# Patient Record
Sex: FEMALE | Race: WHITE | NOT HISPANIC OR LATINO | Employment: OTHER | URBAN - METROPOLITAN AREA
[De-identification: names, ages, dates, MRNs, and addresses within clinical notes are randomized per-mention and may not be internally consistent; named-entity substitution may affect disease eponyms.]

---

## 2019-10-30 ENCOUNTER — HISTORICAL (OUTPATIENT)
Dept: ADMINISTRATIVE | Facility: HOSPITAL | Age: 79
End: 2019-10-30

## 2019-10-30 LAB
ABS NEUT (OLG): 5.44 X10(3)/MCL (ref 2.1–9.2)
BASOPHILS # BLD AUTO: 0.1 X10(3)/MCL (ref 0–0.2)
BASOPHILS NFR BLD AUTO: 1 %
BUN SERPL-MCNC: 20 MG/DL (ref 7–18)
CALCIUM SERPL-MCNC: 9.5 MG/DL (ref 8.5–10.1)
CHLORIDE SERPL-SCNC: 106 MMOL/L (ref 98–107)
CO2 SERPL-SCNC: 29 MMOL/L (ref 21–32)
CREAT SERPL-MCNC: 0.98 MG/DL (ref 0.55–1.02)
CREAT/UREA NIT SERPL: 20.4
EOSINOPHIL # BLD AUTO: 0.2 X10(3)/MCL (ref 0–0.9)
EOSINOPHIL NFR BLD AUTO: 2 %
ERYTHROCYTE [DISTWIDTH] IN BLOOD BY AUTOMATED COUNT: 14.6 % (ref 11.5–17)
GLUCOSE SERPL-MCNC: 110 MG/DL (ref 74–106)
HCT VFR BLD AUTO: 30.6 % (ref 37–47)
HGB BLD-MCNC: 8.6 GM/DL (ref 12–16)
LYMPHOCYTES # BLD AUTO: 2.8 X10(3)/MCL (ref 0.6–4.6)
LYMPHOCYTES NFR BLD AUTO: 30 %
MCH RBC QN AUTO: 22.8 PG (ref 27–31)
MCHC RBC AUTO-ENTMCNC: 28.1 GM/DL (ref 33–36)
MCV RBC AUTO: 81 FL (ref 80–94)
MONOCYTES # BLD AUTO: 0.8 X10(3)/MCL (ref 0.1–1.3)
MONOCYTES NFR BLD AUTO: 9 %
NEUTROPHILS # BLD AUTO: 5.44 X10(3)/MCL (ref 2.1–9.2)
NEUTROPHILS NFR BLD AUTO: 58 %
PLATELET # BLD AUTO: 318 X10(3)/MCL (ref 130–400)
PMV BLD AUTO: 10.4 FL (ref 9.4–12.4)
POTASSIUM SERPL-SCNC: 4.6 MMOL/L (ref 3.5–5.1)
RBC # BLD AUTO: 3.78 X10(6)/MCL (ref 4.2–5.4)
SODIUM SERPL-SCNC: 141 MMOL/L (ref 136–145)
TSH SERPL-ACNC: 0.16 MIU/L (ref 0.36–3.74)
WBC # SPEC AUTO: 9.4 X10(3)/MCL (ref 4.5–11.5)

## 2022-04-10 ENCOUNTER — HISTORICAL (OUTPATIENT)
Dept: ADMINISTRATIVE | Facility: HOSPITAL | Age: 82
End: 2022-04-10

## 2022-04-26 VITALS
WEIGHT: 163.56 LBS | BODY MASS INDEX: 30.88 KG/M2 | SYSTOLIC BLOOD PRESSURE: 186 MMHG | DIASTOLIC BLOOD PRESSURE: 77 MMHG | HEIGHT: 61 IN

## 2023-01-16 ENCOUNTER — HOSPITAL ENCOUNTER (INPATIENT)
Facility: HOSPITAL | Age: 83
LOS: 3 days | Discharge: HOME | DRG: 377 | End: 2023-01-19
Attending: STUDENT IN AN ORGANIZED HEALTH CARE EDUCATION/TRAINING PROGRAM | Admitting: INTERNAL MEDICINE
Payer: MEDICARE

## 2023-01-16 DIAGNOSIS — U07.1 COVID-19: ICD-10-CM

## 2023-01-16 DIAGNOSIS — K92.2 GASTROINTESTINAL HEMORRHAGE, UNSPECIFIED GASTROINTESTINAL HEMORRHAGE TYPE: Primary | ICD-10-CM

## 2023-01-16 DIAGNOSIS — A41.9 SEPSIS, DUE TO UNSPECIFIED ORGANISM, UNSPECIFIED WHETHER ACUTE ORGAN DYSFUNCTION PRESENT: ICD-10-CM

## 2023-01-16 DIAGNOSIS — R09.02 HYPOXIA: ICD-10-CM

## 2023-01-16 DIAGNOSIS — I50.9 CHF (CONGESTIVE HEART FAILURE): ICD-10-CM

## 2023-01-16 DIAGNOSIS — K92.1 MELENA: ICD-10-CM

## 2023-01-16 DIAGNOSIS — R79.89 ELEVATED TROPONIN: ICD-10-CM

## 2023-01-16 DIAGNOSIS — I95.9 HYPOTENSION, UNSPECIFIED HYPOTENSION TYPE: ICD-10-CM

## 2023-01-16 LAB
ABO + RH BLD: NORMAL
ABS NEUT (OLG): 3.24 X10(3)/MCL (ref 2.1–9.2)
ALBUMIN SERPL-MCNC: 3 G/DL (ref 3.4–4.8)
ALBUMIN/GLOB SERPL: 1.2 RATIO (ref 1.1–2)
ALP SERPL-CCNC: 79 UNIT/L (ref 40–150)
ALT SERPL-CCNC: 17 UNIT/L (ref 0–55)
APPEARANCE UR: CLEAR
APTT PPP: 32.5 SECONDS (ref 23.2–33.7)
AST SERPL-CCNC: 39 UNIT/L (ref 5–34)
BACTERIA #/AREA URNS AUTO: NORMAL /HPF
BASOPHILS NFR BLD MANUAL: 0.04 X10(3)/MCL (ref 0–0.2)
BASOPHILS NFR BLD MANUAL: 1 %
BILIRUB UR QL STRIP.AUTO: NEGATIVE MG/DL
BILIRUBIN DIRECT+TOT PNL SERPL-MCNC: 0.8 MG/DL
BLD PROD TYP BPU: NORMAL
BLOOD UNIT EXPIRATION DATE: NORMAL
BLOOD UNIT TYPE CODE: 9500
BNP BLD-MCNC: 408 PG/ML
BUN SERPL-MCNC: 16.9 MG/DL (ref 9.8–20.1)
CALCIUM SERPL-MCNC: 8.4 MG/DL (ref 8.4–10.2)
CHLORIDE SERPL-SCNC: 97 MMOL/L (ref 98–107)
CO2 SERPL-SCNC: 24 MMOL/L (ref 23–31)
COLOR UR AUTO: YELLOW
CREAT SERPL-MCNC: 1.86 MG/DL (ref 0.55–1.02)
CROSSMATCH INTERPRETATION: NORMAL
DISPENSE STATUS: NORMAL
EOSINOPHIL NFR BLD MANUAL: 0.08 X10(3)/MCL (ref 0–0.9)
EOSINOPHIL NFR BLD MANUAL: 2 %
ERYTHROCYTE [DISTWIDTH] IN BLOOD BY AUTOMATED COUNT: 14.6 % (ref 11.5–17)
FLUAV AG UPPER RESP QL IA.RAPID: NOT DETECTED
FLUBV AG UPPER RESP QL IA.RAPID: NOT DETECTED
GFR SERPLBLD CREATININE-BSD FMLA CKD-EPI: 27 MLS/MIN/1.73/M2
GLOBULIN SER-MCNC: 2.6 GM/DL (ref 2.4–3.5)
GLUCOSE SERPL-MCNC: 127 MG/DL (ref 82–115)
GLUCOSE UR QL STRIP.AUTO: ABNORMAL MG/DL
GROUP & RH: NORMAL
HCT VFR BLD AUTO: 33.6 % (ref 37–47)
HGB BLD-MCNC: 10.3 GM/DL (ref 12–16)
IMM GRANULOCYTES # BLD AUTO: 0.04 X10(3)/MCL (ref 0–0.04)
IMM GRANULOCYTES NFR BLD AUTO: 1 %
INDIRECT COOMBS GEL: NORMAL
INR BLD: 1.16 (ref 0–1.3)
INSTRUMENT WBC (OLG): 4.1 X10(3)/MCL
KETONES UR QL STRIP.AUTO: NEGATIVE MG/DL
LACTATE SERPL-SCNC: 1.5 MMOL/L (ref 0.5–2.2)
LEUKOCYTE ESTERASE UR QL STRIP.AUTO: ABNORMAL UNIT/L
LYMPHOCYTES NFR BLD MANUAL: 0.49 X10(3)/MCL
LYMPHOCYTES NFR BLD MANUAL: 12 %
MAGNESIUM SERPL-MCNC: 1.9 MG/DL (ref 1.6–2.6)
MCH RBC QN AUTO: 27.7 PG
MCHC RBC AUTO-ENTMCNC: 30.7 MG/DL (ref 33–36)
MCV RBC AUTO: 90.3 FL (ref 80–94)
MONOCYTES NFR BLD MANUAL: 0.29 X10(3)/MCL (ref 0.1–1.3)
MONOCYTES NFR BLD MANUAL: 7 %
NEUTROPHILS NFR BLD MANUAL: 79 %
NITRITE UR QL STRIP.AUTO: NEGATIVE
NRBC BLD AUTO-RTO: 0 %
PH UR STRIP.AUTO: 6.5 [PH]
PLATELET # BLD AUTO: 191 X10(3)/MCL (ref 130–400)
PLATELET # BLD EST: NORMAL 10*3/UL
PMV BLD AUTO: 11.3 FL (ref 7.4–10.4)
POTASSIUM SERPL-SCNC: 3.9 MMOL/L (ref 3.5–5.1)
PROT SERPL-MCNC: 5.6 GM/DL (ref 5.8–7.6)
PROT UR QL STRIP.AUTO: NEGATIVE MG/DL
PROTHROMBIN TIME: 14.7 SECONDS (ref 12.5–14.5)
RBC # BLD AUTO: 3.72 X10(6)/MCL (ref 4.2–5.4)
RBC #/AREA URNS AUTO: <5 /HPF
RBC MORPH BLD: NORMAL
RBC UR QL AUTO: ABNORMAL UNIT/L
SARS-COV-2 RNA RESP QL NAA+PROBE: DETECTED
SODIUM SERPL-SCNC: 132 MMOL/L (ref 136–145)
SP GR UR STRIP.AUTO: 1 (ref 1–1.03)
SQUAMOUS #/AREA URNS AUTO: <5 /HPF
TROPONIN I SERPL-MCNC: 0.06 NG/ML (ref 0–0.04)
TROPONIN I SERPL-MCNC: 0.08 NG/ML (ref 0–0.04)
UNIT NUMBER: NORMAL
UROBILINOGEN UR STRIP-ACNC: 0.2 MG/DL
WBC # SPEC AUTO: 4.1 X10(3)/MCL (ref 4.5–11.5)
WBC #/AREA URNS AUTO: <5 /HPF

## 2023-01-16 PROCEDURE — 36430 TRANSFUSION BLD/BLD COMPNT: CPT

## 2023-01-16 PROCEDURE — 21400001 HC TELEMETRY ROOM

## 2023-01-16 PROCEDURE — 93010 ELECTROCARDIOGRAM REPORT: CPT | Mod: ,,, | Performed by: INTERNAL MEDICINE

## 2023-01-16 PROCEDURE — 80053 COMPREHEN METABOLIC PANEL: CPT | Performed by: STUDENT IN AN ORGANIZED HEALTH CARE EDUCATION/TRAINING PROGRAM

## 2023-01-16 PROCEDURE — 85025 COMPLETE CBC W/AUTO DIFF WBC: CPT | Performed by: STUDENT IN AN ORGANIZED HEALTH CARE EDUCATION/TRAINING PROGRAM

## 2023-01-16 PROCEDURE — 83605 ASSAY OF LACTIC ACID: CPT | Performed by: STUDENT IN AN ORGANIZED HEALTH CARE EDUCATION/TRAINING PROGRAM

## 2023-01-16 PROCEDURE — 63600175 PHARM REV CODE 636 W HCPCS: Performed by: STUDENT IN AN ORGANIZED HEALTH CARE EDUCATION/TRAINING PROGRAM

## 2023-01-16 PROCEDURE — 96361 HYDRATE IV INFUSION ADD-ON: CPT

## 2023-01-16 PROCEDURE — 96365 THER/PROPH/DIAG IV INF INIT: CPT

## 2023-01-16 PROCEDURE — 96368 THER/DIAG CONCURRENT INF: CPT

## 2023-01-16 PROCEDURE — 85007 BL SMEAR W/DIFF WBC COUNT: CPT | Performed by: STUDENT IN AN ORGANIZED HEALTH CARE EDUCATION/TRAINING PROGRAM

## 2023-01-16 PROCEDURE — 93010 EKG 12-LEAD: ICD-10-PCS | Mod: ,,, | Performed by: INTERNAL MEDICINE

## 2023-01-16 PROCEDURE — 86923 COMPATIBILITY TEST ELECTRIC: CPT | Performed by: STUDENT IN AN ORGANIZED HEALTH CARE EDUCATION/TRAINING PROGRAM

## 2023-01-16 PROCEDURE — 83880 ASSAY OF NATRIURETIC PEPTIDE: CPT | Performed by: PHYSICIAN ASSISTANT

## 2023-01-16 PROCEDURE — 81003 URINALYSIS AUTO W/O SCOPE: CPT | Performed by: STUDENT IN AN ORGANIZED HEALTH CARE EDUCATION/TRAINING PROGRAM

## 2023-01-16 PROCEDURE — 27000221 HC OXYGEN, UP TO 24 HOURS

## 2023-01-16 PROCEDURE — 85610 PROTHROMBIN TIME: CPT | Performed by: STUDENT IN AN ORGANIZED HEALTH CARE EDUCATION/TRAINING PROGRAM

## 2023-01-16 PROCEDURE — 0240U COVID/FLU A&B PCR: CPT | Performed by: STUDENT IN AN ORGANIZED HEALTH CARE EDUCATION/TRAINING PROGRAM

## 2023-01-16 PROCEDURE — 84484 ASSAY OF TROPONIN QUANT: CPT | Performed by: STUDENT IN AN ORGANIZED HEALTH CARE EDUCATION/TRAINING PROGRAM

## 2023-01-16 PROCEDURE — 87040 BLOOD CULTURE FOR BACTERIA: CPT | Performed by: STUDENT IN AN ORGANIZED HEALTH CARE EDUCATION/TRAINING PROGRAM

## 2023-01-16 PROCEDURE — 83735 ASSAY OF MAGNESIUM: CPT | Performed by: STUDENT IN AN ORGANIZED HEALTH CARE EDUCATION/TRAINING PROGRAM

## 2023-01-16 PROCEDURE — 86900 BLOOD TYPING SEROLOGIC ABO: CPT | Performed by: STUDENT IN AN ORGANIZED HEALTH CARE EDUCATION/TRAINING PROGRAM

## 2023-01-16 PROCEDURE — P9016 RBC LEUKOCYTES REDUCED: HCPCS | Performed by: STUDENT IN AN ORGANIZED HEALTH CARE EDUCATION/TRAINING PROGRAM

## 2023-01-16 PROCEDURE — 84484 ASSAY OF TROPONIN QUANT: CPT | Performed by: PHYSICIAN ASSISTANT

## 2023-01-16 PROCEDURE — 93005 ELECTROCARDIOGRAM TRACING: CPT

## 2023-01-16 PROCEDURE — 85730 THROMBOPLASTIN TIME PARTIAL: CPT | Performed by: STUDENT IN AN ORGANIZED HEALTH CARE EDUCATION/TRAINING PROGRAM

## 2023-01-16 PROCEDURE — 11000001 HC ACUTE MED/SURG PRIVATE ROOM

## 2023-01-16 PROCEDURE — 27000207 HC ISOLATION

## 2023-01-16 PROCEDURE — 99291 CRITICAL CARE FIRST HOUR: CPT

## 2023-01-16 PROCEDURE — C9113 INJ PANTOPRAZOLE SODIUM, VIA: HCPCS | Performed by: STUDENT IN AN ORGANIZED HEALTH CARE EDUCATION/TRAINING PROGRAM

## 2023-01-16 PROCEDURE — 25000003 PHARM REV CODE 250: Performed by: STUDENT IN AN ORGANIZED HEALTH CARE EDUCATION/TRAINING PROGRAM

## 2023-01-16 PROCEDURE — 96376 TX/PRO/DX INJ SAME DRUG ADON: CPT

## 2023-01-16 RX ORDER — GLUCAGON 1 MG
1 KIT INJECTION
Status: DISCONTINUED | OUTPATIENT
Start: 2023-01-16 | End: 2023-01-19 | Stop reason: HOSPADM

## 2023-01-16 RX ORDER — AMLODIPINE BESYLATE 2.5 MG/1
2.5 TABLET ORAL
COMMUNITY
Start: 2023-01-02

## 2023-01-16 RX ORDER — ONDANSETRON 2 MG/ML
4 INJECTION INTRAMUSCULAR; INTRAVENOUS EVERY 8 HOURS PRN
Status: DISCONTINUED | OUTPATIENT
Start: 2023-01-16 | End: 2023-01-19 | Stop reason: HOSPADM

## 2023-01-16 RX ORDER — FUROSEMIDE 20 MG/1
20 TABLET ORAL
COMMUNITY
Start: 2023-01-02

## 2023-01-16 RX ORDER — PANTOPRAZOLE SODIUM 40 MG/10ML
80 INJECTION, POWDER, LYOPHILIZED, FOR SOLUTION INTRAVENOUS
Status: COMPLETED | OUTPATIENT
Start: 2023-01-16 | End: 2023-01-16

## 2023-01-16 RX ORDER — ATORVASTATIN CALCIUM 20 MG/1
20 TABLET, FILM COATED ORAL
COMMUNITY
Start: 2022-12-19

## 2023-01-16 RX ORDER — ASPIRIN 81 MG/1
81 TABLET ORAL
COMMUNITY

## 2023-01-16 RX ORDER — LOSARTAN POTASSIUM 100 MG/1
100 TABLET ORAL
COMMUNITY
Start: 2023-01-03

## 2023-01-16 RX ORDER — INSULIN ASPART 100 [IU]/ML
0-5 INJECTION, SOLUTION INTRAVENOUS; SUBCUTANEOUS EVERY 6 HOURS PRN
Status: DISCONTINUED | OUTPATIENT
Start: 2023-01-16 | End: 2023-01-19 | Stop reason: HOSPADM

## 2023-01-16 RX ORDER — FOLIC ACID 1 MG/1
1000 TABLET ORAL
COMMUNITY
Start: 2023-01-02

## 2023-01-16 RX ORDER — IBUPROFEN 200 MG
16 TABLET ORAL
Status: DISCONTINUED | OUTPATIENT
Start: 2023-01-16 | End: 2023-01-19 | Stop reason: HOSPADM

## 2023-01-16 RX ORDER — EMPAGLIFLOZIN 25 MG/1
25 TABLET, FILM COATED ORAL
COMMUNITY
Start: 2023-01-02

## 2023-01-16 RX ORDER — PANTOPRAZOLE SODIUM 40 MG/1
40 TABLET, DELAYED RELEASE ORAL
COMMUNITY
Start: 2023-01-16

## 2023-01-16 RX ORDER — IBUPROFEN 200 MG
24 TABLET ORAL
Status: DISCONTINUED | OUTPATIENT
Start: 2023-01-16 | End: 2023-01-19 | Stop reason: HOSPADM

## 2023-01-16 RX ORDER — FERROUS SULFATE 325(65) MG
1 TABLET ORAL DAILY
COMMUNITY
Start: 2022-06-28

## 2023-01-16 RX ORDER — SODIUM CHLORIDE 9 MG/ML
INJECTION, SOLUTION INTRAVENOUS CONTINUOUS
Status: DISCONTINUED | OUTPATIENT
Start: 2023-01-16 | End: 2023-01-17

## 2023-01-16 RX ORDER — GLIMEPIRIDE 4 MG/1
4 TABLET ORAL
COMMUNITY
Start: 2023-01-13

## 2023-01-16 RX ORDER — CARVEDILOL 25 MG/1
25 TABLET ORAL 2 TIMES DAILY
COMMUNITY
Start: 2023-01-02

## 2023-01-16 RX ADMIN — PANTOPRAZOLE SODIUM 80 MG: 40 INJECTION, POWDER, FOR SOLUTION INTRAVENOUS at 11:01

## 2023-01-16 RX ADMIN — VANCOMYCIN HYDROCHLORIDE 1500 MG: 1.5 INJECTION, POWDER, LYOPHILIZED, FOR SOLUTION INTRAVENOUS at 01:01

## 2023-01-16 RX ADMIN — SODIUM CHLORIDE, POTASSIUM CHLORIDE, SODIUM LACTATE AND CALCIUM CHLORIDE 2178 ML: 600; 310; 30; 20 INJECTION, SOLUTION INTRAVENOUS at 12:01

## 2023-01-16 RX ADMIN — PIPERACILLIN AND TAZOBACTAM 4.5 G: 4; .5 INJECTION, POWDER, LYOPHILIZED, FOR SOLUTION INTRAVENOUS; PARENTERAL at 12:01

## 2023-01-16 RX ADMIN — PANTOPRAZOLE SODIUM 8 MG/HR: 40 INJECTION, POWDER, FOR SOLUTION INTRAVENOUS at 02:01

## 2023-01-16 NOTE — PROGRESS NOTES
Pharmacokinetic Initial Assessment: IV Vancomycin    Assessment/Plan:    Initiate intravenous vancomycin with loading dose of 1500 mg once with subsequent doses when random concentrations are less than 20 mcg/mL  Desired empiric serum trough concentration is 15 to 20 mcg/mL  Draw vancomycin random level on 1/17 at 0500.  Pharmacy will continue to follow and monitor vancomycin.      Please contact pharmacy at extension [phone] with any questions regarding this assessment.     Thank you for the consult,   Naima Torrez       Patient brief summary:  Rosemary Jackson is a 82 y.o. female initiated on antimicrobial therapy with IV Vancomycin for treatment of suspected sepsis    Drug Allergies:   Review of patient's allergies indicates:   Allergen Reactions    Codeine     Niacin     Sulfa (sulfonamide antibiotics)      Other reaction(s): rash       Actual Body Weight:   72.6 kg    Renal Function:   Estimated Creatinine Clearance: 21.2 mL/min (A) (based on SCr of 1.86 mg/dL (H)).,     Dialysis Method (if applicable):  N/A    CBC (last 72 hours):  Recent Labs   Lab Result Units 01/16/23  1216   WBC x10(3)/mcL 4.1*   Hgb gm/dL 10.3*   Hct % 33.6*   Platelet x10(3)/mcL 191       Metabolic Panel (last 72 hours):  Recent Labs   Lab Result Units 01/16/23  1216   Sodium Level mmol/L 132*   Potassium Level mmol/L 3.9   Chloride mmol/L 97*   Carbon Dioxide mmol/L 24   Glucose Level mg/dL 127*   Blood Urea Nitrogen mg/dL 16.9   Creatinine mg/dL 1.86*   Albumin Level g/dL 3.0*   Bilirubin Total mg/dL 0.8   Alkaline Phosphatase unit/L 79   Aspartate Aminotransferase unit/L 39*   Alanine Aminotransferase unit/L 17   Magnesium Level mg/dL 1.90       Microbiologic Results:  Microbiology Results (last 7 days)       Procedure Component Value Units Date/Time    Blood culture #2 **CANNOT BE ORDERED STAT** [[id number]] Collected: 01/16/23 1231    Order Status: Resulted Specimen: Blood Updated: 01/16/23 1236    Blood culture #1 **CANNOT BE ORDERED  STAT** [[id number]] Collected: 01/16/23 1231    Order Status: Resulted Specimen: Blood Updated: 01/16/23 123

## 2023-01-16 NOTE — ED PROVIDER NOTES
Encounter Date: 1/16/2023    SCRIBE #1 NOTE: I, Alea Waller, am scribing for, and in the presence of,  Negrito Duncan IV., MD. I have scribed the following portions of the note - the EKG reading. Other sections scribed: HPI, ROS and physical.     History     Chief Complaint   Patient presents with    GI Bleeding     Pt. C/o blood in stool and generalized weakness, initial BP with EMS 60/30 NS infusing, BP on arrival 93/45.      83 y/o female with a medical hx of iron deficiency anemia and heart valve replacement presents to the ED via EMS for GI bleeding. EMS personnel reported that the pt has been experiencing some generalized weakness for three days. EMS states that the family was there to check on her this morning, when she was coughing up bloody sputum and had a BM that consisted of  dark/tarry/ bloody stools. EMS reports the pt was hypotensive on scene with a BP on 90/30, and she was given codeine, sulphur and 4 g of Zofran en route. Denies any blood thinners. Admits to diarrhea and cough.     Summary of Chart Review: 83 y/o female with presents with GI bleed. Records report that the pt has iron deficiency amenia, with no blood thinners on file.     The history is provided by the EMS personnel, the patient and medical records. No  was used.   Review of patient's allergies indicates:   Allergen Reactions    Codeine     Niacin     Sulfa (sulfonamide antibiotics)      Other reaction(s): rash     History reviewed. No pertinent past medical history.  History reviewed. No pertinent surgical history.  History reviewed. No pertinent family history.     Review of Systems   Constitutional:  Negative for chills and fever.   HENT:  Negative for congestion, rhinorrhea and sore throat.    Eyes:  Negative for visual disturbance.   Respiratory:  Positive for cough. Negative for shortness of breath.    Cardiovascular:  Negative for chest pain and leg swelling.   Gastrointestinal:  Positive for blood in  stool and diarrhea. Negative for abdominal pain, nausea and vomiting.   Genitourinary:  Negative for dysuria, hematuria, vaginal bleeding and vaginal discharge.   Musculoskeletal:  Negative for joint swelling.   Skin:  Negative for rash.   Neurological:  Negative for weakness.   Psychiatric/Behavioral:  Negative for confusion.      Physical Exam     Initial Vitals   BP Pulse Resp Temp SpO2   01/16/23 1139 01/16/23 1139 01/16/23 1139 01/16/23 1141 01/16/23 1139   (!) 93/45 60 19 96.6 °F (35.9 °C) (!) 92 %      MAP       --                Physical Exam    Nursing note and vitals reviewed.  Constitutional: She is not diaphoretic. She is Obese . No distress.   HENT:   Head: Normocephalic and atraumatic.   Eyes: EOM are normal. Pupils are equal, round, and reactive to light.   Neck: Neck supple.   Normal range of motion.  Cardiovascular:  Normal rate and regular rhythm.           No murmur heard.  Pulmonary/Chest: Breath sounds normal. No respiratory distress. She has no wheezes. She has no rales.   Abdominal: Abdomen is soft. She exhibits no distension. There is no abdominal tenderness.   Genitourinary: Rectum:      Guaiac result positive.   Guaiac positive stool. : Acceptable.   Genitourinary Comments: Dark stool      Musculoskeletal:         General: Normal range of motion.      Cervical back: Normal range of motion and neck supple.     Neurological: She is alert and oriented to person, place, and time. She has normal strength.   Skin: Skin is warm. No rash noted. There is pallor.   Psychiatric: She has a normal mood and affect.       ED Course   Critical Care    Date/Time: 1/16/2023 11:59 AM  Performed by: Negrito Duncan IV, MD  Authorized by: Negrito Duncan IV, MD   Direct patient critical care time: 35 minutes  Total critical care time (exclusive of procedural time) : 35 minutes  Critical care time was exclusive of separately billable procedures and treating other patients and teaching  time.  Critical care was time spent personally by me on the following activities: development of treatment plan with patient or surrogate, discussions with consultants, interpretation of cardiac output measurements, evaluation of patient's response to treatment, examination of patient, obtaining history from patient or surrogate, ordering and performing treatments and interventions, ordering and review of laboratory studies, ordering and review of radiographic studies, pulse oximetry, re-evaluation of patient's condition and review of old charts.      Labs Reviewed   COMPREHENSIVE METABOLIC PANEL - Abnormal; Notable for the following components:       Result Value    Sodium Level 132 (*)     Chloride 97 (*)     Glucose Level 127 (*)     Creatinine 1.86 (*)     Protein Total 5.6 (*)     Albumin Level 3.0 (*)     Aspartate Aminotransferase 39 (*)     All other components within normal limits   COVID/FLU A&B PCR - Abnormal; Notable for the following components:    SARS-CoV-2 PCR Detected (*)     All other components within normal limits    Narrative:     The Xpert Xpress SARS-CoV-2/FLU/RSV plus is a rapid, multiplexed real-time PCR test intended for the simultaneous qualitative detection and differentiation of SARS-CoV-2, Influenza A, Influenza B, and respiratory syncytial virus (RSV) viral RNA in either nasopharyngeal swab or nasal swab specimens.         PROTIME-INR - Abnormal; Notable for the following components:    PT 14.7 (*)     All other components within normal limits   TROPONIN I - Abnormal; Notable for the following components:    Troponin-I 0.078 (*)     All other components within normal limits   CBC WITH DIFFERENTIAL - Abnormal; Notable for the following components:    WBC 4.1 (*)     RBC 3.72 (*)     Hgb 10.3 (*)     Hct 33.6 (*)     MCHC 30.7 (*)     MPV 11.3 (*)     All other components within normal limits   MANUAL DIFFERENTIAL - Abnormal; Notable for the following components:    Abs Lymp 0.492 (*)      All other components within normal limits   LACTIC ACID, PLASMA - Normal   MAGNESIUM - Normal   APTT - Normal   BLOOD CULTURE OLG   BLOOD CULTURE [facility]   CBC W/ AUTO DIFFERENTIAL    Narrative:     The following orders were created for panel order CBC auto differential.  Procedure                               Abnormality         Status                     ---------                               -----------         ------                     CBC with Differential[[id number]]        Abnormal            Final result               Manual Differential[[id number]]          Abnormal            Final result                 Please view results for these tests on the individual orders.   URINALYSIS, REFLEX TO URINE CULTURE   B-TYPE NATRIURETIC PEPTIDE   TYPE & SCREEN   PREPARE RBC SOFT     EKG Readings: (Independently Interpreted)   Initial Reading: No STEMI. Rhythm: Normal Sinus Rhythm. Heart Rate: 63. Ectopy: No Ectopy. Conduction: RBBB. ST Segments: Normal ST Segments. T Waves Flipped: I, AVF, V5 and V6. Axis: Normal. Clinical Impression: Normal Sinus Rhythm   1130     Imaging Results              X-Ray Chest AP Portable (Final result)  Result time 01/16/23 12:01:02      Final result by Gio Luevano MD (01/16/23 12:01:02)                   Impression:      Chronic changes seen no acute process      Electronically signed by: Gio Luevano  Date:    01/16/2023  Time:    12:01               Narrative:    EXAMINATION:  XR CHEST AP PORTABLE    CLINICAL HISTORY:  Hypoxemia    TECHNIQUE:  Single frontal view of the chest was performed.    COMPARISON:  10/30/2019    FINDINGS:  There are chronic appearing lung changes seen bilaterally. No evidence of acute infiltrate is seen. No mass is seen. No lesion is seen. No pleural effusion is seen. The heart enlarged in size.  The aorta appears normal. The bones and joints show no acute abnormality.                                    X-Rays:   Independently Interpreted  Readings:   Chest X-Ray: No acute abnormalities.   Medications   piperacillin-tazobactam (ZOSYN) 4.5 g in dextrose 5 % in water (D5W) 5 % 100 mL IVPB (MB+) (4.5 g Intravenous New Bag 1/16/23 1238)   vancomycin - pharmacy to dose (has no administration in time range)   vancomycin 1.5 g in dextrose 5 % 250 mL IVPB (ready to mix) (1,500 mg Intravenous New Bag 1/16/23 1326)   pantoprazole (PROTONIX) 40 mg in sodium chloride 0.9 % 100 mL IVPB (MB+) (has no administration in time range)   ondansetron injection 4 mg (has no administration in time range)   glucose chewable tablet 16 g (has no administration in time range)   glucose chewable tablet 24 g (has no administration in time range)   dextrose 10% bolus 125 mL 125 mL (has no administration in time range)   dextrose 10% bolus 250 mL 250 mL (has no administration in time range)   glucagon (human recombinant) injection 1 mg (has no administration in time range)   0.9%  NaCl infusion (has no administration in time range)   pantoprazole injection 80 mg (80 mg Intravenous Given 1/16/23 1158)   lactated ringers bolus 2,178 mL (0 mLs Intravenous Stopped 1/16/23 1300)     Medical Decision Making:   History:   Old Medical Records: I decided to obtain old medical records.  Old Records Summarized: records from clinic visits.       <> Summary of Records: 81 y/o female with presents with GI bleed. Records report that the pt has iron deficiency amenia, with no blood thinners on file.   Initial Assessment:   See ED course   Differential Diagnosis:   Judging by the patient's chief complaint and pertinent history, the patient has the following possible differential diagnoses, including but not limited to the following.  Some of these are deemed to be lower likelihood and some more likely based on my physical exam and history combined with possible lab work and/or imaging studies.   Please see the pertinent studies, and refer to the HPI.  Some of these diagnoses will take further  "evaluation to fully rule out, perhaps as an outpatient and the patient was encouraged to follow up when discharged for more comprehensive evaluation.    Upper GI bleed: PUD, gastritis, varices, AVM, tumors, erosions, AE fistula  Lower GI bleed: diverticular bleed, colon cancer, AVM, hemorrhoid, AE fistula     Independently Interpreted Test(s):   I have ordered and independently interpreted X-rays - see prior notes.  Clinical Tests:   Lab Tests: Ordered and Reviewed  Radiological Study: Ordered and Reviewed  Medical Tests: Ordered and Reviewed  Sepsis Perfusion Assessment: "I attest a sepsis perfusion exam was performed within 6 hours of sepsis, severe sepsis, or septic shock presentation, following fluid resuscitation."  ED Management:  Emergent blood   IVF 30 cc/kg  Broad spectrum abx   Other:   I have discussed this case with another health care provider.       <> Summary of the Discussion: GI Dr. Celis  Hospitalist         Scribe Attestation:   Scribe #1: I performed the above scribed service and the documentation accurately describes the services I performed. I attest to the accuracy of the note.    Attending Attestation:           Physician Attestation for Scribe:  Physician Attestation Statement for Scribe #1: I, reviewed documentation, as scribed by Alea Waller in my presence, and it is both accurate and complete.           Medical Decision Making  Problems Addressed:  Elevated troponin: acute illness or injury that poses a threat to life or bodily functions  Gastrointestinal hemorrhage, unspecified gastrointestinal hemorrhage type: acute illness or injury that poses a threat to life or bodily functions  Hypotension, unspecified hypotension type: acute illness or injury that poses a threat to life or bodily functions  Sepsis, due to unspecified organism, unspecified whether acute organ dysfunction present: acute illness or injury that poses a threat to life or bodily functions      Amount and/or Complexity " of Data Reviewed  Independent Historian: EMS (see above for summary)  External Data Reviewed: notes from clinic visits and prior labs (see above for summary)  Risk and benefits of testing: discussed   Labs: ordered and reviewed  Radiology: ordered and independent interpretation performed (see above)  ECG/medicine tests: ordered and independent interpretation performed (see above)  Discussion of management or test interpretation with external provider(s): discussed with hospitalist physician and discussed with GI consultant    Risk  Parenteral medications  Parenteral controlled substances   Drug therapy requiring intense monitoring for toxicity   Decision regarding hospitalization    Critical Care  30-74 minutes       ED Course as of 01/16/23 1427   Mon Jan 16, 2023   1154 82-year-old female no known past medical history presenting for dark stools as well as hematemesis per report no witnessed hematemesis by EMS.  Patient hypotensive 90s over 40s on arrival does have some scant dark Hemoccult-positive stool on my exam she is awake alert mentating well pressures quickly declined to 80s over 40s will give emergent blood she is getting a L of fluids pressure bag then an IV Protonix as well while we obtain a workup [AC]   1304 Paged GI   [SJ]   1316 Dr. eClis with GI will come evaluate. Recommends a Protonix drip [SJ]   1317 Hospitalist paged [SJ]   1340 Admitted to hospitalist [SJ]   4565 Troponin elevated - st depression on EKG. No chest pain, not candidate for anticoagulation in setting of GI bleed. Continute to trend, suspect type II  [AC]      ED Course User Index  [AC] Negrito Duncan IV, MD  [SJ] Alea Waller                   Clinical Impression:   Final diagnoses:  [R09.02] Hypoxia  [I95.9] Hypotension, unspecified hypotension type  [A41.9] Sepsis, due to unspecified organism, unspecified whether acute organ dysfunction present  [U07.1] COVID-19  [K92.2] Gastrointestinal hemorrhage, unspecified gastrointestinal  hemorrhage type (Primary)        ED Disposition Condition    Admit Stable        I, Negrito Duncan MD personally performed the history, PE, MDM, and procedures as documented above and agree with the scribe's documentation.           Negrito Duncan IV, MD  01/16/23 1434       Negrito Duncan IV, MD  01/16/23 1772

## 2023-01-16 NOTE — H&P
[facility] Medicine History & Physical Examination       Patient Name: Rosemary Jackson  MRN: [id number]  Patient Class: IP- Inpatient   Admission Date: 1/16/2023   Admitting Physician: Leo Law MD  Length of Stay: 0  Attending Physician: Leo Law MD  Primary Care Provider: Reji Morrow MD  Face-to-Face encounter date: 01/16/2023  Code Status:Full Code   Chief Complaint: GI Bleeding (Pt. C/o blood in stool and generalized weakness, initial BP with EMS 60/30 NS infusing, BP on arrival 93/45. )        Patient information was obtained from patient, patient's family, past medical records and ER records.     HISTORY OF PRESENT ILLNESS:   Rosemary Jackson is a 82 y.o. female with a past medical history of essential hypertension, hyperlipidemia, CHF, CAD, diabetes mellitus type 2, and iron-deficiency anemia presented to [facility] on 1/16/2023 for hematemesis and melena.  EMS reported that patient was hypotensive with BP 90/30. Patient reports weakness for the past 3 days with dark/tarry stools, nausea, and cough. Patient denies chest pain, shortness of breath, dizziness, syncope,  abdominal pain, vomiting, diarrhea, congestion, sore throat, dysuria, hematuria, fever, and chills.    Initial vital signs in ED were BP 93/45, temperature 35.9° C, pulse 60, respirations 19, and SpO2 92% on nasal cannula.  Labs revealed WBC 4.1, RBC 3.72, hemoglobin 10.3, hematocrit 33.6, sodium 132, chloride 97, BUN 16.9, creatinine 1.86, glucose 127, AST 39, ALT 17, lactic acid 1.5, and troponin 0.078.  Flu testing was negative.  COVID testing was positive.  Blood cultures were obtained.  EKG revealed normal sinus rhythm with right bundle-branch block and rate of 63 beats per minute. Chest x-ray revealed chronic changes with no acute process.  Hemoccult testing was positive.  Patient remained hypotensive and was given I unit of emergent blood with fluid bolus and IV Protonix 80 mg.  Patient  was also given IV vancomycin 1.5 g and Zosyn 4.5 g in ED. Patient was admitted to hospital medicine service for further medical management.     PAST MEDICAL HISTORY:   Essential hypertension  Hyperlipidemia  CHF  CAD  Iron-deficiency anemia  Diabetes mellitus type 2    PAST SURGICAL HISTORY:   Heart valve replacement  Hysterectomy  Colonoscopy   Cholecystectomy  Appendectomy   Right shoulder surgery    ALLERGIES:   Codeine, Niacin, and Sulfa (sulfonamide antibiotics)    FAMILY HISTORY:   Reviewed and negative    SOCIAL HISTORY:   Current everyday smoker, smokes 1 pack per day   Denies alcohol and drug use    HOME MEDICATIONS:     Prior to Admission medications    Medication Sig Start Date End Date Taking? Authorizing Provider   amLODIPine (NORVASC) 2.5 MG tablet Take 2.5 mg by mouth. 1/2/23  Yes Historical Provider   aspirin (ECOTRIN) 81 MG EC tablet Take 81 mg by mouth.   Yes Historical Provider   carvediloL (COREG) 25 MG tablet Take 25 mg by mouth 2 (two) times daily. 1/2/23  Yes Historical Provider   ferrous sulfate (FEOSOL) 325 mg (65 mg iron) Tab tablet Take 1 tablet by mouth once daily. 6/28/22  Yes Historical Provider   folic acid (FOLVITE) 1 MG tablet Take 1,000 mcg by mouth. 1/2/23  Yes Historical Provider   furosemide (LASIX) 20 MG tablet Take 20 mg by mouth. 1/2/23  Yes Historical Provider   glimepiride (AMARYL) 4 MG tablet Take 4 mg by mouth. 1/13/23  Yes Historical Provider   JARDIANCE 25 mg tablet Take 25 mg by mouth. 1/2/23  Yes Historical Provider   losartan (COZAAR) 100 MG tablet Take 100 mg by mouth. 1/3/23  Yes Historical Provider   pantoprazole (PROTONIX) 40 MG tablet Take 40 mg by mouth. 1/16/23  Yes Historical Provider   atorvastatin (LIPITOR) 20 MG tablet Take 20 mg by mouth. 12/19/22   Historical Provider       REVIEW OF SYSTEMS:   Except as documented, all other systems reviewed and negative     PHYSICAL EXAM:     VITAL SIGNS: 24 HRS MIN & MAX LAST   Temp  Min: 96.6 °F (35.9 °C)  Max: 97.9  °F (36.6 °C) 97.9 °F (36.6 °C)   BP  Min: 63/42  Max: 118/43 (!) 112/38     Pulse  Min: 60  Max: 69  69   Resp  Min: 16  Max: 27 20   SpO2  Min: 92 %  Max: 100 % 100 %       General appearance: Female in no apparent distress.  HEENNT: Atraumatic head.   Lungs: Clear to auscultation bilaterally.   Heart: Regular rate and rhythm.    Abdomen: Soft, non-distended, non-tender. Bowel sounds are normal.   Extremities: No cyanosis, clubbing, or edema. No deformities.   Skin: No Rash. Warm and dry.   Neuro: Awake, alert, and oriented. Motor and sensory exams grossly intact.   Psych/mental status: Appropriate mood and affect. Responds appropriately to questions.     LABS AND IMAGING:     Recent Labs   Lab 01/16/23  1216   WBC 4.1*   RBC 3.72*   HGB 10.3*   HCT 33.6*   MCV 90.3   MCH 27.7   MCHC 30.7*   RDW 14.6      MPV 11.3*       Recent Labs   Lab 01/16/23  1216   *   K 3.9   CO2 24   BUN 16.9   CREATININE 1.86*   CALCIUM 8.4   MG 1.90   ALBUMIN 3.0*   ALKPHOS 79   ALT 17   AST 39*   BILITOT 0.8       Microbiology Results (last 7 days)       Procedure Component Value Units Date/Time    Blood culture #2 **CANNOT BE ORDERED STAT** [[id number]] Collected: 01/16/23 1231    Order Status: Resulted Specimen: Blood Updated: 01/16/23 1236    Blood culture #1 **CANNOT BE ORDERED STAT** [[id number]] Collected: 01/16/23 1231    Order Status: Resulted Specimen: Blood Updated: 01/16/23 1235             X-Ray Chest AP Portable  Narrative: EXAMINATION:  XR CHEST AP PORTABLE    CLINICAL HISTORY:  Hypoxemia    TECHNIQUE:  Single frontal view of the chest was performed.    COMPARISON:  10/30/2019    FINDINGS:  There are chronic appearing lung changes seen bilaterally. No evidence of acute infiltrate is seen. No mass is seen. No lesion is seen. No pleural effusion is seen. The heart enlarged in size.  The aorta appears normal. The bones and joints show no acute abnormality.  Impression: Chronic changes seen no acute  process    Electronically signed by: Gio Wisenoé  Date:    01/16/2023  Time:    12:01        ASSESSMENT & PLAN:   Assessment:  Symptomatic anemia   NSTEMI  Acute COVID 19 infection   CAL  Hyponatremia  History of essential HTN, HLD, CHF, and diabetes mellitus type 2    Plan:  NPO  IV fluid hydration  Protonix gtt   Monitor H&H  GI consulted, appreciate recommendations   Cardiac monitoring  Trend troponin   BNP, Echo pending   Insulin sliding scale with Accu-Checks  Continue appropriate home medications  Labs in a.m.    VTE Prophylaxis: SCDs    __________________________________________________________________________  INPATIENT LIST OF MEDICATIONS     Scheduled Meds:   piperacillin-tazobactam (ZOSYN) IVPB  4.5 g Intravenous Q8H    vancomycin (VANCOCIN) IVPB  20 mg/kg (Dosing Weight) Intravenous Once     Continuous Infusions:   pantoprazole (PROTONIX) IV infusion       PRN Meds:.Pharmacy to dose Vancomycin consult **AND** vancomycin - pharmacy to dose      I, KIAN WilkinsC, have reviewed and discussed the case with Dr. Leo Law MD  Please see the following addendum for further assessment and plan from there attending MD.    01/16/2023    ________________________________________________________________________________    MD Addendum:  I,  assumed care of this patient today at ---am/pm  For the patient encounter, I performed the substantive portion of the visit, I reviewed the PA documentation, treatment plan, and medical decision making.  I had face to face time with this patient     A. History:  83 years old female history of CHF presented to emergency room complaining of black stool for 3 days    B. Physical exam:  Lungs bilateral clear to auscultation, heart regular S1-S2, abdomen is soft nontender positive bowel sound, extremity no edema    C. Medical decision making:  Lab work showed WBC 4000, hemoglobin 10, a creatinine 1.8, stroke O2 blood was positive, chest x-ray was unremarkable, COVID-19  test was positive    Patient was admitted to the hospital for acute GI bleeding     Hypotension was treated with normal saline IV fluid and gastroenterology was consulted    Since the patient got 2 COVID vaccine with a booster, patient does not have hypoxia, COVID-19 was not treated    Discharge Planning and Disposition: No mobility needs. Ambulating well. Good social support system.   Anticipated discharge    All diagnosis and differential diagnosis have been reviewed; assessment and plan has been documented; I have personally reviewed the labs and test results that are presently available; I have reviewed the patients medication list; I have reviewed the consulting providers response and recommendations. I have reviewed or attempted to review medical records based upon their availability.    All of the patient and family questions have been addressed and answered. Patient's is agreeable to the above stated plan. I will continue to monitor closely and make adjustments to medical management as needed.      01/16/2023

## 2023-01-16 NOTE — CONSULTS
Gastroenterology Consultation Note    Reason for Consult:  Melena, Hematemesis    PCP:   Reji Morrow MD      History of Present Illness (HPI):  82 year old female known to Dr. Shukla with Hx CHF, CAD, diabetes mellitus type 2, and JEMIMA presented to the ED via EMS with hematemesis and melena.    On arrival, patient hypotensive and afebrile with O2 92% on NC. Labs notable for H&H 10.3/33.6, WBC 4.1, sodium 132, Cr 1.86, glucose 127, AST 39, , trop .078 and she was found to have COVID+. EKG revealed normal sinus rhythm with right bundle-branch block and rate of 63 beats per minute. Chest x-ray revealed chronic changes with no acute process.  Hemoccult testing was positive.     Patient with black tarry stools since Friday, most recent episode this morning. She endorses nausea and dry heaving but denies emesis. Denies regular NSAID use at home and denies use of blood thinners.     Previous records reviewed.     Most recent colonoscopy 01/21/2020 for anemia with 2 polyps removed, TA x2  VCE 02/04/2020  for JEMIMA workup normal with no sources of bleeding.   EGD 11/25/2019 for anemia with single non bleeding AVM in greater curvature of the stomach treated with argon plasma.     ROS:  Review of Systems   HENT:  Negative for sore throat.    Respiratory:  Negative for hemoptysis, shortness of breath, wheezing and stridor.    Gastrointestinal:  Positive for abdominal pain, melena and nausea. Negative for diarrhea, heartburn and vomiting.   Neurological:  Negative for seizures and loss of consciousness.   Psychiatric/Behavioral:  The patient is not nervous/anxious.      Medical History:   History reviewed. No pertinent past medical history.    Surgical History:   History reviewed. No pertinent surgical history.    Family History:   History reviewed. No pertinent family history..     Social History:   Social History     Tobacco Use    Smoking status: Not on file    Smokeless tobacco: Not on file   Substance Use Topics     "Alcohol use: Not on file       Allergies:  Review of patient's allergies indicates:   Allergen Reactions    Codeine     Niacin     Sulfa (sulfonamide antibiotics)      Other reaction(s): rash       (Not in a hospital admission)        Objective Findings:    Vital Signs:  /71   Pulse 66   Temp 97.9 °F (36.6 °C)   Resp (!) 22   Ht 5' 1" (1.549 m)   Wt 72.6 kg (160 lb)   SpO2 99%   BMI 30.23 kg/m²   Body mass index is 30.23 kg/m².    Physical Exam:  Physical Exam  Constitutional:       General: She is not in acute distress.  HENT:      Head: Normocephalic.      Nose: Nose normal.   Eyes:      General: No scleral icterus.     Extraocular Movements: Extraocular movements intact.   Pulmonary:      Effort: No respiratory distress.      Breath sounds: No stridor.   Abdominal:      General: Abdomen is flat. Bowel sounds are normal. There is no distension.      Palpations: Abdomen is soft.      Tenderness: There is no abdominal tenderness.   Skin:     Coloration: Skin is not jaundiced.   Neurological:      General: No focal deficit present.      Mental Status: She is alert. Mental status is at baseline.   Psychiatric:         Mood and Affect: Mood normal.         Behavior: Behavior normal.       Labs:  Recent Results (from the past 24 hour(s))   COVID/FLU A&B PCR    Collection Time: 01/16/23 11:59 AM   Result Value Ref Range    Influenza A PCR Not Detected Not Detected    Influenza B PCR Not Detected Not Detected    SARS-CoV-2 PCR Detected (A) Not Detected, Negative, Invalid   Comprehensive metabolic panel    Collection Time: 01/16/23 12:16 PM   Result Value Ref Range    Sodium Level 132 (L) 136 - 145 mmol/L    Potassium Level 3.9 3.5 - 5.1 mmol/L    Chloride 97 (L) 98 - 107 mmol/L    Carbon Dioxide 24 23 - 31 mmol/L    Glucose Level 127 (H) 82 - 115 mg/dL    Blood Urea Nitrogen 16.9 9.8 - 20.1 mg/dL    Creatinine 1.86 (H) 0.55 - 1.02 mg/dL    Calcium Level Total 8.4 8.4 - 10.2 mg/dL    Protein Total 5.6 (L) 5.8 " - 7.6 gm/dL    Albumin Level 3.0 (L) 3.4 - 4.8 g/dL    Globulin 2.6 2.4 - 3.5 gm/dL    Albumin/Globulin Ratio 1.2 1.1 - 2.0 ratio    Bilirubin Total 0.8 <=1.5 mg/dL    Alkaline Phosphatase 79 40 - 150 unit/L    Alanine Aminotransferase 17 0 - 55 unit/L    Aspartate Aminotransferase 39 (H) 5 - 34 unit/L    eGFR 27 mls/min/1.73/m2   Lactic acid, plasma    Collection Time: 01/16/23 12:16 PM   Result Value Ref Range    Lactic Acid Level 1.5 0.5 - 2.2 mmol/L   Magnesium    Collection Time: 01/16/23 12:16 PM   Result Value Ref Range    Magnesium Level 1.90 1.60 - 2.60 mg/dL   Protime-INR    Collection Time: 01/16/23 12:16 PM   Result Value Ref Range    PT 14.7 (H) 12.5 - 14.5 seconds    INR 1.16 0.00 - 1.30   Troponin I    Collection Time: 01/16/23 12:16 PM   Result Value Ref Range    Troponin-I 0.078 (H) 0.000 - 0.045 ng/mL   Type & Screen    Collection Time: 01/16/23 12:16 PM   Result Value Ref Range    Group & Rh O NEG     Indirect Ander GEL NEG    CBC with Differential    Collection Time: 01/16/23 12:16 PM   Result Value Ref Range    WBC 4.1 (L) 4.5 - 11.5 x10(3)/mcL    RBC 3.72 (L) 4.20 - 5.40 x10(6)/mcL    Hgb 10.3 (L) 12.0 - 16.0 gm/dL    Hct 33.6 (L) 37.0 - 47.0 %    MCV 90.3 80.0 - 94.0 fL    MCH 27.7 pg    MCHC 30.7 (L) 33.0 - 36.0 mg/dL    RDW 14.6 11.5 - 17.0 %    Platelet 191 130 - 400 x10(3)/mcL    MPV 11.3 (H) 7.4 - 10.4 fL    IG# 0.04 0 - 0.04 x10(3)/mcL    IG% 1.0 %    NRBC% 0.0 %   APTT    Collection Time: 01/16/23 12:16 PM   Result Value Ref Range    PTT 32.5 23.2 - 33.7 seconds   Manual Differential    Collection Time: 01/16/23 12:16 PM   Result Value Ref Range    Neut Man 79 %    Lymph Man 12 %    Monocyte Man 7 %    Eos Man 2 %    Basophil Man 1 %    Instr WBC 4.1 x10(3)/mcL    Abs Mono 0.287 0.1 - 1.3 x10(3)/mcL    Abs Eos  0.082 0 - 0.9 x10(3)/mcL    Abs Baso 0.041 0 - 0.2 x10(3)/mcL    Abs Lymp 0.492 (L) 0.6 - 4.6 x10(3)/mcL    Abs Neut 3.239 2.1 - 9.2 x10(3)/mcL    RBC Morph Normal Normal     Platelet Est Normal Normal, Adequate   Prepare RBC    Collection Time: 01/16/23 12:16 PM   Result Value Ref Range    UNIT NUMBER [id number]     UNIT ABO/RH O NEG     DISPENSE STATUS Issued     Unit Expiration [id number]     Product Code Z1893O33     Unit Blood Type Code 9500     CROSSMATCH INTERPRETATION Compatible    BNP    Collection Time: 01/16/23 12:16 PM   Result Value Ref Range    Natriuretic Peptide 408.0 (H) <=100.0 pg/mL   Urinalysis, Reflex to Urine Culture Urine, Clean Catch    Collection Time: 01/16/23  2:56 PM    Specimen: Urine   Result Value Ref Range    Color, UA Yellow Yellow, Light-Yellow, Dark Yellow, Suzanne, Straw    Appearance, UA Clear Clear    Specific Gravity, UA 1.005 1.001 - 1.030    pH, UA 6.5 5.0 - 8.5    Protein, UA Negative Negative mg/dL    Glucose, UA Trace (A) Negative, Normal mg/dL    Ketones, UA Negative Negative mg/dL    Blood, UA Trace (A) Negative unit/L    Bilirubin, UA Negative Negative mg/dL    Urobilinogen, UA 0.2 0.2, 1.0, Normal mg/dL    Nitrites, UA Negative Negative    Leukocyte Esterase, UA Trace (A) Negative unit/L   Urinalysis, Microscopic    Collection Time: 01/16/23  2:56 PM   Result Value Ref Range    RBC, UA <5 <=5 /HPF    WBC, UA <5 <=5 /HPF    Squamous Epithelial Cells, UA <5 <=5 /HPF    Bacteria, UA None Seen None Seen, Rare, Occasional /HPF   Echo    Collection Time: 01/16/23  5:05 PM   Result Value Ref Range    BSA 1.77 m2    TDI SEPTAL 0.05 m/s    LV LATERAL E/E' RATIO 21.50 m/s    LV SEPTAL E/E' RATIO 17.20 m/s    Right Atrial Pressure (from IVC) 8 mmHg    EF 40 %    Left Ventricular Outflow Tract Mean Velocity 0.88 cm/s    Left Ventricular Outflow Tract Mean Gradient 4.00 mmHg    TDI LATERAL 0.04 m/s    PV PEAK VELOCITY 1.18 cm/s    LVIDd 4.68 3.5 - 6.0 cm    IVS 1.64 (A) 0.6 - 1.1 cm    Posterior Wall 1.35 (A) 0.6 - 1.1 cm    LVIDs 2.91 2.1 - 4.0 cm    FS 38 28 - 44 %    LV mass 290.73 g    LA size 5.00 cm    TAPSE 1.64 cm    Left Ventricle Relative  Wall Thickness 0.58 cm    AV regurgitation pressure 1/2 time 302 ms    AV mean gradient 31 mmHg    AV valve area 1.14 cm2    AV Velocity Ratio 0.35     AV index (prosthetic) 0.36     MV mean gradient 3 mmHg    MV valve area p 1/2 method 3.86 cm2    MV valve area by continuity eq 2.31 cm2    E/A ratio 0.76     Mean e' 0.05 m/s    E wave deceleration time 238.00 msec    LVOT diameter 2.00 cm    LVOT area 3.1 cm2    LVOT peak cristian 1.27 m/s    LVOT peak VTI 32.30 cm    Ao peak cristian 3.58 m/s    Ao VTI 88.8 cm    Mr max cristian 4.31 m/s    LVOT stroke volume 101.42 cm3    AV peak gradient 51 mmHg    MV peak gradient 6 mmHg    TV rest pulmonary artery pressure 26 mmHg    E/E' ratio 19.11 m/s    MV Peak E Cristian 0.86 m/s    AR Max Cristian 3.76 m/s    TR Max Cristian 2.15 m/s    MV VTI 43.9 cm    MV stenosis pressure 1/2 time 57.00 ms    MV Peak A Cristian 1.13 m/s    LV Systolic Volume 32.50 mL    LV Systolic Volume Index 18.9 mL/m2    LV Diastolic Volume 101.00 mL    LV Diastolic Volume Index 58.72 mL/m2    LV Mass Index 169 g/m2    Triscuspid Valve Regurgitation Peak Gradient 18 mmHg    LA Volume Index (Mod) 30.6 mL/m2    LA volume (mod) 52.70 cm3       X-Ray Chest AP Portable   Final Result      Chronic changes seen no acute process         Electronically signed by: Gio Luevano   Date:    01/16/2023   Time:    12:01            Assessment/Plan:  1. Gastrointestinal hemorrhage, unspecified gastrointestinal hemorrhage type    2. Hypoxia    3. Hypotension, unspecified hypotension type    4. Sepsis, due to unspecified organism, unspecified whether acute organ dysfunction present    5. COVID-19    6. Elevated troponin    7. CHF (congestive heart failure)       82 year old female known to Dr. Shukla with Hx CHF, CAD, diabetes mellitus type 2, and JEMIMA presented to the ED via EMS with hematemesis and melena.    Symptomatic anemia with melena   - 10.3/33.6 with no blood transfusions thus far  - NPO at midnight with plans for EGD tomorrow    NSTEMI  - cardiology following, echo pending  3. DM II  4. COVID+, asymptomatic       Margot Cervantes PA-C  Gastroenterology  [facility]     Thank you for allowing us to participate in the care of Rosemary Jackson.

## 2023-01-17 ENCOUNTER — ANESTHESIA (OUTPATIENT)
Dept: SURGERY | Facility: HOSPITAL | Age: 83
DRG: 377 | End: 2023-01-17
Payer: MEDICARE

## 2023-01-17 ENCOUNTER — ANESTHESIA EVENT (OUTPATIENT)
Dept: SURGERY | Facility: HOSPITAL | Age: 83
DRG: 377 | End: 2023-01-17
Payer: MEDICARE

## 2023-01-17 LAB
ALBUMIN SERPL-MCNC: 2.8 G/DL (ref 3.4–4.8)
ALBUMIN/GLOB SERPL: 1.1 RATIO (ref 1.1–2)
ALP SERPL-CCNC: 71 UNIT/L (ref 40–150)
ALT SERPL-CCNC: 12 UNIT/L (ref 0–55)
AST SERPL-CCNC: 28 UNIT/L (ref 5–34)
AV INDEX (PROSTH): 0.36
AV MEAN GRADIENT: 31 MMHG
AV PEAK GRADIENT: 51 MMHG
AV REGURGITATION PRESSURE HALF TIME: 302 MS
AV VALVE AREA: 1.14 CM2
AV VELOCITY RATIO: 0.35
BASOPHILS # BLD AUTO: 0.02 X10(3)/MCL (ref 0–0.2)
BASOPHILS NFR BLD AUTO: 0.6 %
BILIRUBIN DIRECT+TOT PNL SERPL-MCNC: 0.9 MG/DL
BSA FOR ECHO PROCEDURE: 1.77 M2
BUN SERPL-MCNC: 15.6 MG/DL (ref 9.8–20.1)
CALCIUM SERPL-MCNC: 8.3 MG/DL (ref 8.4–10.2)
CHLORIDE SERPL-SCNC: 105 MMOL/L (ref 98–107)
CO2 SERPL-SCNC: 25 MMOL/L (ref 23–31)
CREAT SERPL-MCNC: 1.71 MG/DL (ref 0.55–1.02)
CV ECHO LV RWT: 0.58 CM
DOP CALC AO PEAK VEL: 3.58 M/S
DOP CALC AO VTI: 88.8 CM
DOP CALC LVOT AREA: 3.1 CM2
DOP CALC LVOT DIAMETER: 2 CM
DOP CALC LVOT PEAK VEL: 1.27 M/S
DOP CALC LVOT STROKE VOLUME: 101.42 CM3
DOP CALC MV VTI: 43.9 CM
DOP CALCLVOT PEAK VEL VTI: 32.3 CM
E WAVE DECELERATION TIME: 238 MSEC
E/A RATIO: 0.76
E/E' RATIO: 19.11 M/S
ECHO LV POSTERIOR WALL: 1.35 CM (ref 0.6–1.1)
EJECTION FRACTION: 50 %
EOSINOPHIL # BLD AUTO: 0.1 X10(3)/MCL (ref 0–0.9)
EOSINOPHIL NFR BLD AUTO: 3.2 %
ERYTHROCYTE [DISTWIDTH] IN BLOOD BY AUTOMATED COUNT: 15.8 % (ref 11.5–17)
FRACTIONAL SHORTENING: 38 % (ref 28–44)
GFR SERPLBLD CREATININE-BSD FMLA CKD-EPI: 30 MLS/MIN/1.73/M2
GLOBULIN SER-MCNC: 2.5 GM/DL (ref 2.4–3.5)
GLUCOSE SERPL-MCNC: 116 MG/DL (ref 82–115)
HCT VFR BLD AUTO: 37.9 % (ref 37–47)
HGB BLD-MCNC: 11.6 GM/DL (ref 12–16)
IMM GRANULOCYTES # BLD AUTO: 0.04 X10(3)/MCL (ref 0–0.04)
IMM GRANULOCYTES NFR BLD AUTO: 1.3 %
INTERVENTRICULAR SEPTUM: 1.64 CM (ref 0.6–1.1)
LEFT ATRIUM SIZE: 5 CM
LEFT ATRIUM VOLUME INDEX MOD: 30.6 ML/M2
LEFT ATRIUM VOLUME MOD: 52.7 CM3
LEFT INTERNAL DIMENSION IN SYSTOLE: 2.91 CM (ref 2.1–4)
LEFT VENTRICLE DIASTOLIC VOLUME INDEX: 58.72 ML/M2
LEFT VENTRICLE DIASTOLIC VOLUME: 101 ML
LEFT VENTRICLE MASS INDEX: 169 G/M2
LEFT VENTRICLE SYSTOLIC VOLUME INDEX: 18.9 ML/M2
LEFT VENTRICLE SYSTOLIC VOLUME: 32.5 ML
LEFT VENTRICULAR INTERNAL DIMENSION IN DIASTOLE: 4.68 CM (ref 3.5–6)
LEFT VENTRICULAR MASS: 290.73 G
LV LATERAL E/E' RATIO: 21.5 M/S
LV SEPTAL E/E' RATIO: 17.2 M/S
LVOT MG: 4 MMHG
LVOT MV: 0.88 CM/S
LYMPHOCYTES # BLD AUTO: 0.93 X10(3)/MCL (ref 0.6–4.6)
LYMPHOCYTES NFR BLD AUTO: 30.2 %
MCH RBC QN AUTO: 27.8 PG
MCHC RBC AUTO-ENTMCNC: 30.6 MG/DL (ref 33–36)
MCV RBC AUTO: 90.9 FL (ref 80–94)
MONOCYTES # BLD AUTO: 0.48 X10(3)/MCL (ref 0.1–1.3)
MONOCYTES NFR BLD AUTO: 15.6 %
MV MEAN GRADIENT: 3 MMHG
MV PEAK A VEL: 1.13 M/S
MV PEAK E VEL: 0.86 M/S
MV PEAK GRADIENT: 6 MMHG
MV STENOSIS PRESSURE HALF TIME: 57 MS
MV VALVE AREA BY CONTINUITY EQUATION: 2.31 CM2
MV VALVE AREA P 1/2 METHOD: 3.86 CM2
NEUTROPHILS # BLD AUTO: 1.51 X10(3)/MCL (ref 2.1–9.2)
NEUTROPHILS NFR BLD AUTO: 49.1 %
NRBC BLD AUTO-RTO: 0 %
PISA AR MAX VEL: 3.76 M/S
PISA MRMAX VEL: 4.31 M/S
PISA TR MAX VEL: 2.15 M/S
PLATELET # BLD AUTO: 150 X10(3)/MCL (ref 130–400)
PMV BLD AUTO: 10.5 FL (ref 7.4–10.4)
POCT GLUCOSE: 157 MG/DL (ref 70–110)
POCT GLUCOSE: 214 MG/DL (ref 70–110)
POCT GLUCOSE: 42 MG/DL (ref 70–110)
POCT GLUCOSE: 48 MG/DL (ref 70–110)
POCT GLUCOSE: 60 MG/DL (ref 70–110)
POTASSIUM SERPL-SCNC: 4.1 MMOL/L (ref 3.5–5.1)
PROT SERPL-MCNC: 5.3 GM/DL (ref 5.8–7.6)
PV PEAK VELOCITY: 1.18 CM/S
RA PRESSURE: 8 MMHG
RBC # BLD AUTO: 4.17 X10(6)/MCL (ref 4.2–5.4)
SODIUM SERPL-SCNC: 137 MMOL/L (ref 136–145)
TDI LATERAL: 0.04 M/S
TDI SEPTAL: 0.05 M/S
TDI: 0.05 M/S
TR MAX PG: 18 MMHG
TRICUSPID ANNULAR PLANE SYSTOLIC EXCURSION: 1.64 CM
TROPONIN I SERPL-MCNC: 0.08 NG/ML (ref 0–0.04)
TROPONIN I SERPL-MCNC: 0.08 NG/ML (ref 0–0.04)
TV REST PULMONARY ARTERY PRESSURE: 26 MMHG
VANCOMYCIN TROUGH SERPL-MCNC: 11.9 UG/ML (ref 15–20)
WBC # SPEC AUTO: 3.1 X10(3)/MCL (ref 4.5–11.5)

## 2023-01-17 PROCEDURE — 84484 ASSAY OF TROPONIN QUANT: CPT | Performed by: PHYSICIAN ASSISTANT

## 2023-01-17 PROCEDURE — 80053 COMPREHEN METABOLIC PANEL: CPT | Performed by: PHYSICIAN ASSISTANT

## 2023-01-17 PROCEDURE — 27000207 HC ISOLATION

## 2023-01-17 PROCEDURE — C9113 INJ PANTOPRAZOLE SODIUM, VIA: HCPCS | Performed by: STUDENT IN AN ORGANIZED HEALTH CARE EDUCATION/TRAINING PROGRAM

## 2023-01-17 PROCEDURE — 25000003 PHARM REV CODE 250: Performed by: INTERNAL MEDICINE

## 2023-01-17 PROCEDURE — 37000008 HC ANESTHESIA 1ST 15 MINUTES: Performed by: INTERNAL MEDICINE

## 2023-01-17 PROCEDURE — 37000009 HC ANESTHESIA EA ADD 15 MINS: Performed by: INTERNAL MEDICINE

## 2023-01-17 PROCEDURE — 25000003 PHARM REV CODE 250: Performed by: STUDENT IN AN ORGANIZED HEALTH CARE EDUCATION/TRAINING PROGRAM

## 2023-01-17 PROCEDURE — 25000003 PHARM REV CODE 250: Performed by: NURSE ANESTHETIST, CERTIFIED REGISTERED

## 2023-01-17 PROCEDURE — 63600175 PHARM REV CODE 636 W HCPCS: Performed by: INTERNAL MEDICINE

## 2023-01-17 PROCEDURE — 27000221 HC OXYGEN, UP TO 24 HOURS

## 2023-01-17 PROCEDURE — 80202 ASSAY OF VANCOMYCIN: CPT | Performed by: STUDENT IN AN ORGANIZED HEALTH CARE EDUCATION/TRAINING PROGRAM

## 2023-01-17 PROCEDURE — 63600175 PHARM REV CODE 636 W HCPCS: Performed by: NURSE ANESTHETIST, CERTIFIED REGISTERED

## 2023-01-17 PROCEDURE — 43235 EGD DIAGNOSTIC BRUSH WASH: CPT | Performed by: INTERNAL MEDICINE

## 2023-01-17 PROCEDURE — 21400001 HC TELEMETRY ROOM

## 2023-01-17 PROCEDURE — 25000003 PHARM REV CODE 250: Performed by: PHYSICIAN ASSISTANT

## 2023-01-17 PROCEDURE — 36415 COLL VENOUS BLD VENIPUNCTURE: CPT | Performed by: PHYSICIAN ASSISTANT

## 2023-01-17 PROCEDURE — 63600175 PHARM REV CODE 636 W HCPCS: Performed by: STUDENT IN AN ORGANIZED HEALTH CARE EDUCATION/TRAINING PROGRAM

## 2023-01-17 PROCEDURE — 85025 COMPLETE CBC W/AUTO DIFF WBC: CPT | Performed by: PHYSICIAN ASSISTANT

## 2023-01-17 PROCEDURE — 36415 COLL VENOUS BLD VENIPUNCTURE: CPT | Performed by: STUDENT IN AN ORGANIZED HEALTH CARE EDUCATION/TRAINING PROGRAM

## 2023-01-17 RX ORDER — CARVEDILOL 12.5 MG/1
25 TABLET ORAL 2 TIMES DAILY
Status: DISCONTINUED | OUTPATIENT
Start: 2023-01-17 | End: 2023-01-19 | Stop reason: HOSPADM

## 2023-01-17 RX ORDER — LIDOCAINE HYDROCHLORIDE 20 MG/ML
INJECTION, SOLUTION EPIDURAL; INFILTRATION; INTRACAUDAL; PERINEURAL
Status: DISCONTINUED | OUTPATIENT
Start: 2023-01-17 | End: 2023-01-17

## 2023-01-17 RX ORDER — LANOLIN ALCOHOL/MO/W.PET/CERES
1 CREAM (GRAM) TOPICAL DAILY
Status: DISCONTINUED | OUTPATIENT
Start: 2023-01-18 | End: 2023-01-19 | Stop reason: HOSPADM

## 2023-01-17 RX ORDER — LOSARTAN POTASSIUM 50 MG/1
100 TABLET ORAL DAILY
Status: DISCONTINUED | OUTPATIENT
Start: 2023-01-18 | End: 2023-01-19 | Stop reason: HOSPADM

## 2023-01-17 RX ORDER — ATORVASTATIN CALCIUM 10 MG/1
20 TABLET, FILM COATED ORAL NIGHTLY
Status: DISCONTINUED | OUTPATIENT
Start: 2023-01-17 | End: 2023-01-19 | Stop reason: HOSPADM

## 2023-01-17 RX ORDER — AMLODIPINE BESYLATE 2.5 MG/1
2.5 TABLET ORAL DAILY
Status: DISCONTINUED | OUTPATIENT
Start: 2023-01-18 | End: 2023-01-19 | Stop reason: HOSPADM

## 2023-01-17 RX ORDER — FOLIC ACID 1 MG/1
1000 TABLET ORAL DAILY
Status: DISCONTINUED | OUTPATIENT
Start: 2023-01-18 | End: 2023-01-19 | Stop reason: HOSPADM

## 2023-01-17 RX ORDER — PROPOFOL 10 MG/ML
VIAL (ML) INTRAVENOUS
Status: DISCONTINUED | OUTPATIENT
Start: 2023-01-17 | End: 2023-01-17

## 2023-01-17 RX ORDER — DEXTROSE MONOHYDRATE AND SODIUM CHLORIDE 5; .9 G/100ML; G/100ML
INJECTION, SOLUTION INTRAVENOUS CONTINUOUS
Status: DISCONTINUED | OUTPATIENT
Start: 2023-01-17 | End: 2023-01-18

## 2023-01-17 RX ADMIN — PANTOPRAZOLE SODIUM 8 MG/HR: 40 INJECTION, POWDER, FOR SOLUTION INTRAVENOUS at 12:01

## 2023-01-17 RX ADMIN — DEXTROSE 250 ML: 10 SOLUTION INTRAVENOUS at 11:01

## 2023-01-17 RX ADMIN — DEXTROSE 125 ML: 10 SOLUTION INTRAVENOUS at 10:01

## 2023-01-17 RX ADMIN — PANTOPRAZOLE SODIUM 8 MG/HR: 40 INJECTION, POWDER, FOR SOLUTION INTRAVENOUS at 05:01

## 2023-01-17 RX ADMIN — DEXTROSE 250 ML: 10 SOLUTION INTRAVENOUS at 05:01

## 2023-01-17 RX ADMIN — LIDOCAINE HYDROCHLORIDE 4 ML: 20 INJECTION, SOLUTION EPIDURAL; INFILTRATION; INTRACAUDAL; PERINEURAL at 02:01

## 2023-01-17 RX ADMIN — PROPOFOL 25 MG: 10 INJECTION, EMULSION INTRAVENOUS at 02:01

## 2023-01-17 RX ADMIN — CARVEDILOL 25 MG: 12.5 TABLET, FILM COATED ORAL at 09:01

## 2023-01-17 RX ADMIN — SODIUM CHLORIDE: 9 INJECTION, SOLUTION INTRAVENOUS at 02:01

## 2023-01-17 RX ADMIN — ATORVASTATIN CALCIUM 20 MG: 10 TABLET, FILM COATED ORAL at 09:01

## 2023-01-17 RX ADMIN — DEXTROSE AND SODIUM CHLORIDE: 5; 900 INJECTION, SOLUTION INTRAVENOUS at 02:01

## 2023-01-17 RX ADMIN — PROPOFOL 50 MG: 10 INJECTION, EMULSION INTRAVENOUS at 02:01

## 2023-01-17 NOTE — ANESTHESIA POSTPROCEDURE EVALUATION
Anesthesia Post Evaluation    Patient: Rosemary Jackson    Procedure(s) Performed: Procedure(s) (LRB):  EGD (N/A)    Final Anesthesia Type: general      Patient location during evaluation: PACU  Patient participation: Yes- Able to Participate  Level of consciousness: awake and alert  Post-procedure vital signs: reviewed and stable  Pain management: adequate  Airway patency: patent    PONV status at discharge: No PONV  Anesthetic complications: no      Respiratory status: unassisted  Hydration status: euvolemic  Follow-up not needed.          Vitals Value Taken Time   /43 01/17/23 1449   Temp 36.6 °C (97.8 °F) 01/17/23 1449   Pulse nl 01/17/23 1605   Resp nl 01/17/23 1605   SpO2 100% 01/17/23 1605         No case tracking events are documented in the log.      Pain/Charly Score: No data recorded

## 2023-01-17 NOTE — PROGRESS NOTES
[facility]  Hospital Medicine Progress Note        Chief Complaint: Inpatient Follow-up for GI bleed,COVID    HPI:    82 y.o. female with a past medical history of essential hypertension, hyperlipidemia, CHF, CAD, diabetes mellitus type 2, and iron-deficiency anemia presented to [facility] on 1/16/2023 for hematemesis and melena.  EMS reported that patient was hypotensive with BP 90/30. Patient reports weakness for the past 3 days with dark/tarry stools, nausea, and cough. Patient denies chest pain, shortness of breath, dizziness, syncope,  abdominal pain, vomiting, diarrhea, congestion, sore throat, dysuria, hematuria, fever, and chills.    Initial vital signs in ED were BP 93/45, temperature 35.9° C, pulse 60, respirations 19, and SpO2 92% on nasal cannula.  Labs revealed WBC 4.1, RBC 3.72, hemoglobin 10.3, hematocrit 33.6, sodium 132, chloride 97, BUN 16.9, creatinine 1.86, glucose 127, AST 39, ALT 17, lactic acid 1.5, and troponin 0.078.  Flu testing was negative.  COVID testing was positive.  Blood cultures were obtained.  EKG revealed normal sinus rhythm with right bundle-branch block and rate of 63 beats per minute. Chest x-ray revealed chronic changes with no acute process.  Hemoccult testing was positive.  Patient remained hypotensive and was given I unit of emergent blood with fluid bolus and IV Protonix 80 mg.  Patient was also given IV vancomycin 1.5 g and Zosyn 4.5 g in ED. Patient was admitted to hospital medicine service for further medical management.       Interval Hx:   Patient was seen and examined at bedside this morning.  Family at bedside.  Patient reported she had 1 bowel movement small quantity but dark in color.  Did not report any visible blood.  Patient received 1 unit PRBC yesterday.  Patient's hemoglobin improved to 11.6 this a.m..    Objective/physical exam:  General: In no acute distress, afebrile  Chest: Clear to auscultation bilaterally, wearing OxyMask , no  accessory muscle use    Heart: RRR, +S1, S2, no appreciable murmur  Abdomen: Soft, nontender, BS +  MSK: Warm, no lower extremity edema, no clubbing or cyanosis  Neurologic: Alert and oriented x4, moving all extremities with good strength    VITAL SIGNS: 24 HRS MIN & MAX LAST   Temp  Min: 96.6 °F (35.9 °C)  Max: 98.8 °F (37.1 °C) 98.8 °F (37.1 °C)   BP  Min: 63/42  Max: 145/64 (!) 145/64     Pulse  Min: 60  Max: 70  66   Resp  Min: 16  Max: 29 18   SpO2  Min: 92 %  Max: 100 % 98 %       Recent Labs   Lab 01/16/23  1216 01/17/23  0631   WBC 4.1* 3.1*   RBC 3.72* 4.17*   HGB 10.3* 11.6*   HCT 33.6* 37.9   MCV 90.3 90.9   MCH 27.7 27.8   MCHC 30.7* 30.6*   RDW 14.6 15.8    150   MPV 11.3* 10.5*       Recent Labs   Lab 01/16/23  1216 01/17/23  0631   * 137   K 3.9 4.1   CO2 24 25   BUN 16.9 15.6   CREATININE 1.86* 1.71*   CALCIUM 8.4 8.3*   MG 1.90  --    ALBUMIN 3.0* 2.8*   ALKPHOS 79 71   ALT 17 12   AST 39* 28   BILITOT 0.8 0.9          Microbiology Results (last 7 days)       Procedure Component Value Units Date/Time    Blood culture #2 **CANNOT BE ORDERED STAT** [[id number]] Collected: 01/16/23 1231    Order Status: Resulted Specimen: Blood Updated: 01/16/23 1236    Blood culture #1 **CANNOT BE ORDERED STAT** [[id number]] Collected: 01/16/23 1231    Order Status: Resulted Specimen: Blood Updated: 01/16/23 1235             See below for Radiology    Scheduled Med:   perflutren lipid microspheres  1.3 mL Intravenous Once    vancomycin (VANCOCIN) IVPB  1,250 mg Intravenous Once        Continuous Infusions:   sodium chloride 0.9%      pantoprazole (PROTONIX) IV infusion 8 mg/hr (01/17/23 0518)        PRN Meds:  dextrose 10%, dextrose 10%, dextrose 10%, dextrose 10%, glucagon (human recombinant), glucagon (human recombinant), glucose, glucose, insulin aspart U-100, ondansetron, Pharmacy to dose Vancomycin consult **AND** vancomycin - pharmacy to dose       Assessment/Plan:  Acute blood loss anemia requiring  PRBC transfusion   Possible upper GI bleed  NSTEMI likely demand mediated  Acute COVID 19 infection   CAL  Hyponatremia  Hypoglycemia in the setting of diabetes mellitus  History of essential HTN, HLD, CHF, and diabetes mellitus type 2    GI on board.  Patient to have  EGD today  Follow up EGD note for further recommendations  Cont iv ppi meanwhile   Will Start the patient on D5 NS  given hypoglycemia with mild creatinine elevation  Patient is stable from the COVID standpoint hold off on remdesivir, dexamethasone   Continue supportive care   Blood pressure has improved, it is on the higher side.  Possibly low blood pressure on admission likely poor p.o. intake.    We will DC antibiotics as there is no evidence of bacterial infection  Obtain home medications.  We will resume p.o. meds post procedure once cleared for p.o. intake.    Monitor sugars closely, cover on sliding scale needed  Continue to monitor on tele       VTE prophylaxis:  SCDs    Patient condition:  Fair    Anticipated discharge and Disposition:   To be decided    Critical care time spent:  35 minutes   Critical care diagnosis:  Active GI bleed needing EGD,  Hypoglycemia needing IV dextrose  All diagnosis and differential diagnosis have been reviewed; assessment and plan has been documented; I have personally reviewed the labs and test results that are presently available; I have reviewed the patients medication list; I have reviewed the consulting providers response and recommendations. I have reviewed or attempted to review medical records based upon their availability    All of the patient's questions have been  addressed and answered. Patient's is agreeable to the above stated plan. I will continue to monitor closely and make adjustments to medical management as needed.  _____________________________________________________________________    Nutrition Status:    Radiology:  X-Ray Chest AP Portable  Narrative: EXAMINATION:  XR CHEST AP  PORTABLE    CLINICAL HISTORY:  Hypoxemia    TECHNIQUE:  Single frontal view of the chest was performed.    COMPARISON:  10/30/2019    FINDINGS:  There are chronic appearing lung changes seen bilaterally. No evidence of acute infiltrate is seen. No mass is seen. No lesion is seen. No pleural effusion is seen. The heart enlarged in size.  The aorta appears normal. The bones and joints show no acute abnormality.  Impression: Chronic changes seen no acute process    Electronically signed by: Gio Luevano  Date:    01/16/2023  Time:    12:01      Kareen Vieira MD   01/17/2023

## 2023-01-17 NOTE — PROVATION PATIENT INSTRUCTIONS
Discharge Summary/Instructions after an Endoscopic Procedure  Patient Name: Rosemary Jackson  Patient MRN: [id number]  Patient YOB: 1940 Tuesday, January 17, 2023  Víctor Celis MD  Dear patient,  As a result of recent federal legislation (The Federal Cures Act), you may   receive lab or pathology results from your procedure in your [facility]   account before your physician is able to contact you. Your physician or   their representative will relay the results to you with their   recommendations at their soonest availability.  Thank you,  RESTRICTIONS:  During your procedure today, you received medications for sedation.  These   medications may affect your judgment, balance and coordination.  Therefore,   for 24 hours, you have the following restrictions:   - DO NOT drive a car, operate machinery, make legal/financial decisions,   sign important papers or drink alcohol.    ACTIVITY:  Today: no heavy lifting, straining or running due to procedural   sedation/anesthesia.  The following day: return to full activity including work.  DIET:  Eat and drink normally unless instructed otherwise.     TREATMENT FOR COMMON SIDE EFFECTS:  - Mild abdominal pain, nausea, belching, bloating or excessive gas:  rest,   eat lightly and use a heating pad.  - Sore Throat: treat with throat lozenges and/or gargle with warm salt   water.  - Because air was used during the procedure, expelling large amounts of air   from your rectum or belching is normal.  - If a bowel prep was taken, you may not have a bowel movement for 1-3 days.    This is normal.  SYMPTOMS TO WATCH FOR AND REPORT TO YOUR PHYSICIAN:  1. Abdominal pain or bloating, other than gas cramps.  2. Chest pain.  3. Back pain.  4. Signs of infection such as: chills or fever occurring within 24 hours   after the procedure.  5. Rectal bleeding, which would show as bright red, maroon, or black stools.   (A tablespoon of blood from the rectum is not serious, especially  if   hemorrhoids are present.)  6. Vomiting.  7. Weakness or dizziness.  GO DIRECTLY TO THE NEAREST EMERGENCY ROOM IF YOU HAVE ANY OF THE FOLLOWING:      Difficulty breathing              Chills and/or fever over 101 F   Persistent vomiting and/or vomiting blood   Severe abdominal pain   Severe chest pain   Black, tarry stools   Bleeding- more than one tablespoon   Any other symptom or condition that you feel may need urgent attention  Your doctor recommends these additional instructions:  If any biopsies were taken, your doctors clinic will contact you in 1 to 2   weeks with any results.  - Return patient to hospital [facility] for ongoing care.   - Observe patient's clinical course.   - Perform an H. pylori stool antigen (HpSA) test at the next available   appointment.   - Use Protonix (pantoprazole) 40 mg PO BID.   - The findings and recommendations were discussed with the patient.  For questions, problems or results please call your physician - Víctor Celis MD at Work:  [phone].  [facility], EMERGENCY ROOM PHONE NUMBER: [phone]  IF A COMPLICATION OR EMERGENCY SITUATION ARISES AND YOU ARE UNABLE TO REACH   YOUR PHYSICIAN - GO DIRECTLY TO THE EMERGENCY ROOM.  MD Víctor Vasquez MD  1/17/2023 2:32:15 PM  This report has been verified and signed electronically.  Dear patient,  As a result of recent federal legislation (The Federal Cures Act), you may   receive lab or pathology results from your procedure in your [facility]   account before your physician is able to contact you. Your physician or   their representative will relay the results to you with their   recommendations at their soonest availability.  Thank you,  PROVATION

## 2023-01-17 NOTE — NURSING
Nurses Note -- 4 Eyes      1/16/2023   11:12 PM      Skin assessed during: Admit      [x] No Pressure Injuries Present    [x]Prevention Measures Documented      [] Yes- Altered Skin Integrity Present or Discovered   [] LDA Added if Not in Epic (Describe Wound)   [] New Altered Skin Integrity was Present on Admit and Documented in LDA   [] Wound Image Taken    Wound Care Consulted? No    Attending Nurse:  Ronel Hess RN     Second RN/Staff Member: Diamond Swan RN

## 2023-01-17 NOTE — TRANSFER OF CARE
"Anesthesia Transfer of Care Note    Patient: Rosemary Jackson    Procedure(s) Performed: Procedure(s) (LRB):  EGD (N/A)    Patient location: GI    Anesthesia Type: MAC    Transport from OR: Transported from OR on 2-3 L/min O2 by NC with adequate spontaneous ventilation    Post pain: adequate analgesia    Post assessment: no apparent anesthetic complications    Post vital signs: stable    Level of consciousness: awake    Nausea/Vomiting: no nausea/vomiting    Complications: none    Transfer of care protocol was followed      Last vitals:   Visit Vitals  BP (!) 110/53 (BP Location: Right arm, Patient Position: Lying)   Pulse 69   Temp 36.5 °C (97.7 °F)   Resp 18   Ht 5' 1" (1.549 m)   Wt 72.6 kg (160 lb)   SpO2 100%   Breastfeeding No   BMI 30.23 kg/m²     "

## 2023-01-17 NOTE — ANESTHESIA PREPROCEDURE EVALUATION
01/17/2023  Rosemary Jackson is a 82 y.o., female.    82 year old female known to Dr. Shukla with Hx CHF, CAD, diabetes mellitus type 2, and JEMIMA presented to the ED via EMS with hematemesis and melena.     On arrival, patient hypotensive and afebrile with O2 92% on NC. Labs notable for H&H 10.3/33.6, WBC 4.1, sodium 132, Cr 1.86, glucose 127, AST 39, , trop .078 and she was found to have COVID+. EKG revealed normal sinus rhythm with right bundle-branch block and rate of 63 beats per minute. Chest x-ray revealed chronic changes with no acute process.  Hemoccult testing was positive.      Patient with black tarry stools since Friday, most recent episode this morning. She endorses nausea and dry heaving but denies emesis. Denies regular NSAID use at home and denies use of blood thinners.     Pre-op Assessment    I have reviewed the Patient Summary Reports.     I have reviewed the Nursing Notes. I have reviewed the NPO Status.   I have reviewed the Medications.     Review of Systems  Anesthesia Hx:   Denies Personal Hx of Anesthesia complications.   Hematology/Oncology:         -- Anemia:   Cardiovascular:   CAD   CHF    Endocrine:   Diabetes  Obesity / BMI > 30      Physical Exam  General: Well nourished, Cooperative and Alert    Airway:  Mallampati: II   Mouth Opening: Normal        Anesthesia Plan  Type of Anesthesia, risks & benefits discussed:    Anesthesia Type: Gen Natural Airway  Intra-op Monitoring Plan: Standard ASA Monitors  Post Op Pain Control Plan: IV/PO Opioids PRN  Induction:  IV  Informed Consent: Informed consent signed with the Patient and all parties understand the risks and agree with anesthesia plan.  All questions answered.   ASA Score: 3  Day of Surgery Review of History & Physical: H&P Update referred to the surgeon/provider.    Ready For Surgery From Anesthesia Perspective.      .

## 2023-01-17 NOTE — PROGRESS NOTES
Pharmacokinetic Assessment Follow Up: IV Vancomycin    Vancomycin serum concentration assessment(s):    The random level was drawn correctly and can be used to guide therapy at this time. The measurement is below the desired definitive target range of 15 to 20 mcg/mL.    Vancomycin Regimen Plan:  vanc 1250mg x1 now and repeat level on 01/18 @04:30.        Drug levels (last 3 results):  Recent Labs   Lab Result Units 01/17/23  0631   Vancomycin Trough ug/ml 11.9*       Pharmacy will continue to follow and monitor vancomycin.    Please contact pharmacy at extension [phone] for questions regarding this assessment.    Thank you for the consult,   Ab Celis       Patient brief summary:  Rosemary Jackson is a 82 y.o. female initiated on antimicrobial therapy with IV Vancomycin for treatment of sepsis    The patient's current regimen is dose base on level    Drug Allergies:   Review of patient's allergies indicates:   Allergen Reactions    Codeine     Niacin     Sulfa (sulfonamide antibiotics)      Other reaction(s): rash       Actual Body Weight:   72.6kg    Renal Function:   Estimated Creatinine Clearance: 23.1 mL/min (A) (based on SCr of 1.71 mg/dL (H)).,     Dialysis Method (if applicable):  N/A    CBC (last 72 hours):  Recent Labs   Lab Result Units 01/16/23  1216 01/17/23  0631   WBC x10(3)/mcL 4.1* 3.1*   Hgb gm/dL 10.3* 11.6*   Hct % 33.6* 37.9   Platelet x10(3)/mcL 191 150   Mono % %  --  15.6   Monocyte Man % 7  --    Eos % %  --  3.2   Eos Man % 2  --    Basophil % %  --  0.6   Basophil Man % 1  --        Metabolic Panel (last 72 hours):  Recent Labs   Lab Result Units 01/16/23  1216 01/16/23  1456 01/17/23  0631   Sodium Level mmol/L 132*  --  137   Potassium Level mmol/L 3.9  --  4.1   Chloride mmol/L 97*  --  105   Carbon Dioxide mmol/L 24  --  25   Glucose Level mg/dL 127*  --  116*   Glucose, UA mg/dL  --  Trace*  --    Blood Urea Nitrogen mg/dL 16.9  --  15.6   Creatinine mg/dL 1.86*  --  1.71*   Albumin  Level g/dL 3.0*  --  2.8*   Bilirubin Total mg/dL 0.8  --  0.9   Alkaline Phosphatase unit/L 79  --  71   Aspartate Aminotransferase unit/L 39*  --  28   Alanine Aminotransferase unit/L 17  --  12   Magnesium Level mg/dL 1.90  --   --        Vancomycin Administrations:  vancomycin given in the last 96 hours                     vancomycin 1.5 g in dextrose 5 % 250 mL IVPB (ready to mix) (mg) 1,500 mg New Bag 01/16/23 1326                    Microbiologic Results:  Microbiology Results (last 7 days)       Procedure Component Value Units Date/Time    Blood culture #2 **CANNOT BE ORDERED STAT** [[id number]] Collected: 01/16/23 1231    Order Status: Resulted Specimen: Blood Updated: 01/16/23 1236    Blood culture #1 **CANNOT BE ORDERED STAT** [[id number]] Collected: 01/16/23 1231    Order Status: Resulted Specimen: Blood Updated: 01/16/23 1238

## 2023-01-18 LAB
BASOPHILS # BLD AUTO: 0.02 X10(3)/MCL (ref 0–0.2)
BASOPHILS NFR BLD AUTO: 0.5 %
EOSINOPHIL # BLD AUTO: 0.09 X10(3)/MCL (ref 0–0.9)
EOSINOPHIL NFR BLD AUTO: 2.2 %
ERYTHROCYTE [DISTWIDTH] IN BLOOD BY AUTOMATED COUNT: 15.5 % (ref 11.5–17)
HCT VFR BLD AUTO: 35 % (ref 37–47)
HGB BLD-MCNC: 10.9 GM/DL (ref 12–16)
IMM GRANULOCYTES # BLD AUTO: 0.04 X10(3)/MCL (ref 0–0.04)
IMM GRANULOCYTES NFR BLD AUTO: 1 %
LYMPHOCYTES # BLD AUTO: 1.19 X10(3)/MCL (ref 0.6–4.6)
LYMPHOCYTES NFR BLD AUTO: 28.5 %
MCH RBC QN AUTO: 28.1 PG
MCHC RBC AUTO-ENTMCNC: 31.1 MG/DL (ref 33–36)
MCV RBC AUTO: 90.2 FL (ref 80–94)
MONOCYTES # BLD AUTO: 0.6 X10(3)/MCL (ref 0.1–1.3)
MONOCYTES NFR BLD AUTO: 14.4 %
NEUTROPHILS # BLD AUTO: 2.23 X10(3)/MCL (ref 2.1–9.2)
NEUTROPHILS NFR BLD AUTO: 53.4 %
NRBC BLD AUTO-RTO: 0 %
PLATELET # BLD AUTO: 149 X10(3)/MCL (ref 130–400)
PMV BLD AUTO: 10.9 FL (ref 7.4–10.4)
POCT GLUCOSE: 128 MG/DL (ref 70–110)
POCT GLUCOSE: 151 MG/DL (ref 70–110)
POCT GLUCOSE: 205 MG/DL (ref 70–110)
POCT GLUCOSE: 72 MG/DL (ref 70–110)
POCT GLUCOSE: 82 MG/DL (ref 70–110)
RBC # BLD AUTO: 3.88 X10(6)/MCL (ref 4.2–5.4)
WBC # SPEC AUTO: 4.2 X10(3)/MCL (ref 4.5–11.5)

## 2023-01-18 PROCEDURE — 25000003 PHARM REV CODE 250: Performed by: INTERNAL MEDICINE

## 2023-01-18 PROCEDURE — 36415 COLL VENOUS BLD VENIPUNCTURE: CPT | Performed by: INTERNAL MEDICINE

## 2023-01-18 PROCEDURE — 27000207 HC ISOLATION

## 2023-01-18 PROCEDURE — 27000221 HC OXYGEN, UP TO 24 HOURS

## 2023-01-18 PROCEDURE — 85025 COMPLETE CBC W/AUTO DIFF WBC: CPT | Performed by: INTERNAL MEDICINE

## 2023-01-18 PROCEDURE — 21400001 HC TELEMETRY ROOM

## 2023-01-18 RX ORDER — FUROSEMIDE 20 MG/1
20 TABLET ORAL DAILY
Status: DISCONTINUED | OUTPATIENT
Start: 2023-01-18 | End: 2023-01-19 | Stop reason: HOSPADM

## 2023-01-18 RX ORDER — BENZONATATE 100 MG/1
100 CAPSULE ORAL 3 TIMES DAILY PRN
Status: DISCONTINUED | OUTPATIENT
Start: 2023-01-18 | End: 2023-01-19 | Stop reason: HOSPADM

## 2023-01-18 RX ORDER — PANTOPRAZOLE SODIUM 40 MG/1
40 TABLET, DELAYED RELEASE ORAL
Status: DISCONTINUED | OUTPATIENT
Start: 2023-01-19 | End: 2023-01-19 | Stop reason: HOSPADM

## 2023-01-18 RX ADMIN — CARVEDILOL 25 MG: 12.5 TABLET, FILM COATED ORAL at 10:01

## 2023-01-18 RX ADMIN — FUROSEMIDE 20 MG: 20 TABLET ORAL at 10:01

## 2023-01-18 RX ADMIN — FERROUS SULFATE TAB 325 MG (65 MG ELEMENTAL FE) 1 EACH: 325 (65 FE) TAB at 09:01

## 2023-01-18 RX ADMIN — FOLIC ACID 1000 MCG: 1 TABLET ORAL at 09:01

## 2023-01-18 RX ADMIN — CARVEDILOL 25 MG: 12.5 TABLET, FILM COATED ORAL at 09:01

## 2023-01-18 RX ADMIN — AMLODIPINE BESYLATE 2.5 MG: 2.5 TABLET ORAL at 09:01

## 2023-01-18 RX ADMIN — ATORVASTATIN CALCIUM 20 MG: 10 TABLET, FILM COATED ORAL at 10:01

## 2023-01-18 RX ADMIN — LOSARTAN POTASSIUM 100 MG: 50 TABLET, FILM COATED ORAL at 09:01

## 2023-01-18 NOTE — PROGRESS NOTES
"Inpatient Nutrition Evaluation    Admit Date: 1/16/2023   Total duration of encounter: 2 days    Nutrition Recommendation/Prescription     Continue oral diet as tolerated.    Nutrition Assessment     Chart Review    Reason Seen: continuous nutrition monitoring    Malnutrition Screening Tool Results   Have you recently lost weight without trying?: No  Have you been eating poorly because of a decreased appetite?: No   MST Score: 0     Diagnosis:  Acute blood loss anemia requiring PRBC transfusion   Possible upper GI bleed  NSTEMI likely demand mediated  Acute COVID 19 infection   CAL  Hyponatremia  Hypoglycemia in the setting of diabetes mellitus    Relevant Medical History: hypertension, hyperlipidemia, CHF, CAD, diabetes mellitus type 2, and iron-deficiency anemia     Nutrition-Related Medications: D5NS, ferrous sulfate, folic acid, insulin    Nutrition-Related Labs:  1/17/23 Glu 116, Ca 8.3, Alb 2.8    Diet Order: DIET RENAL NON-DIALYSIS Diabetic  Oral Supplement Order: none  Appetite/Oral Intake: good/not applicable  Factors Affecting Nutritional Intake: none identified  Food/Confucianist/Cultural Preferences: none reported  Food Allergies: none reported       Wound(s):   N/A    Comments    1/18/23 No weight loss or decreased appetite per malnutrition screening tool, nurse reports patient tolerating diet without issues.    Anthropometrics    Height: 5' 1" (154.9 cm) Height Method: Stated  Last Weight: 72.6 kg (160 lb) (01/16/23 1141) Weight Method: Stated  BMI (Calculated): 30.2  BMI Classification: obese grade I (BMI 30-34.9)     Ideal Body Weight (IBW), Female: 105 lb     % Ideal Body Weight, Female (lb): 152.38 %                             Usual Weight Provided By: unable to obtain usual weight    Wt Readings from Last 3 Encounters:   01/16/23 1141 72.6 kg (160 lb)   07/24/19 0837 74.2 kg (163 lb 9.3 oz)   06/12/19 0809 79.6 kg (175 lb 7.8 oz)      Weight Change(s) Since Admission:  Admit Weight: 72.6 kg (160 lb) " (01/16/23 2183)    Patient Education    Not applicable.    Monitoring & Evaluation     Dietitian will monitor food and beverage intake.  Nutrition Risk/Follow-Up: low (follow-up in 5-7 days)  Patients assigned 'low nutrition risk' status do not qualify for a full nutritional assessment but will be monitored and re-evaluated in a 5-7 day time period. Please consult if re-evaluation needed sooner.

## 2023-01-18 NOTE — PROGRESS NOTES
[facility]  Hospital Medicine Progress Note        Chief Complaint: Inpatient Follow-up for GI bleed,COVID    HPI:    82 y.o. female with a past medical history of essential hypertension, hyperlipidemia, CHF, CAD, diabetes mellitus type 2, and iron-deficiency anemia presented to [facility] on 1/16/2023 for hematemesis and melena.  EMS reported that patient was hypotensive with BP 90/30. Patient reports weakness for the past 3 days with dark/tarry stools, nausea, and cough. Patient denies chest pain, shortness of breath, dizziness, syncope,  abdominal pain, vomiting, diarrhea, congestion, sore throat, dysuria, hematuria, fever, and chills.    Initial vital signs in ED were BP 93/45, temperature 35.9° C, pulse 60, respirations 19, and SpO2 92% on nasal cannula.  Labs revealed WBC 4.1, RBC 3.72, hemoglobin 10.3, hematocrit 33.6, sodium 132, chloride 97, BUN 16.9, creatinine 1.86, glucose 127, AST 39, ALT 17, lactic acid 1.5, and troponin 0.078.  Flu testing was negative.  COVID testing was positive.  Blood cultures were obtained.  EKG revealed normal sinus rhythm with right bundle-branch block and rate of 63 beats per minute. Chest x-ray revealed chronic changes with no acute process.  Hemoccult testing was positive.  Patient remained hypotensive and was given I unit of emergent blood with fluid bolus and IV Protonix 80 mg.  Patient was also given IV vancomycin 1.5 g and Zosyn 4.5 g in ED. Patient was admitted to hospital medicine service for further medical management.     Patient was having melanotic stools, received 1 unit PRBC which improved hemoglobin, underwent EGD which showed nonbleeding gastric ulcers with no stigmata of bleeding, melanotic stools resolved.  Patient had an echo which showed EF 50% mildly decreased systolic function    Interval Hx:   Patient was seen and examined at bedside this morning.  Family at bedside.  Patient reported she had 1 bowel movement small quantity ,no  e/o black stools.  Did not report any visible blood.  Vitals showed patient was on 2 L nasal cannula night but this morning patient was on room air.  Patient denied any shortness off but reported cough..    Objective/physical exam:  General: In no acute distress, afebrile  Chest: faint crackles b/l scattered   Heart: RRR, +S1, S2, no appreciable murmur  Abdomen: Soft, nontender, BS +  MSK: Warm, no lower extremity edema, no clubbing or cyanosis  Neurologic: Alert and oriented x4, moving all extremities with good strength    VITAL SIGNS: 24 HRS MIN & MAX LAST   Temp  Min: 97.7 °F (36.5 °C)  Max: 99.2 °F (37.3 °C) 98.1 °F (36.7 °C)   BP  Min: 110/54  Max: 155/53 (!) 146/67     Pulse  Min: 59  Max: 70  (!) 59   Resp  Min: 18  Max: 20 18   SpO2  Min: 92 %  Max: 100 % 97 %       Recent Labs   Lab 01/16/23  1216 01/17/23  0631 01/18/23  1205   WBC 4.1* 3.1* 4.2*   RBC 3.72* 4.17* 3.88*   HGB 10.3* 11.6* 10.9*   HCT 33.6* 37.9 35.0*   MCV 90.3 90.9 90.2   MCH 27.7 27.8 28.1   MCHC 30.7* 30.6* 31.1*   RDW 14.6 15.8 15.5    150 149   MPV 11.3* 10.5* 10.9*       Recent Labs   Lab 01/16/23  1216 01/17/23  0631   * 137   K 3.9 4.1   CO2 24 25   BUN 16.9 15.6   CREATININE 1.86* 1.71*   CALCIUM 8.4 8.3*   MG 1.90  --    ALBUMIN 3.0* 2.8*   ALKPHOS 79 71   ALT 17 12   AST 39* 28   BILITOT 0.8 0.9          Microbiology Results (last 7 days)       Procedure Component Value Units Date/Time    Blood culture #1 **CANNOT BE ORDERED STAT** [[id number]]  (Normal) Collected: 01/16/23 1231    Order Status: Completed Specimen: Blood Updated: 01/18/23 1300     CULTURE, BLOOD ([facility]) No Growth At 48 Hours    Blood culture #2 **CANNOT BE ORDERED STAT** [[id number]]  (Normal) Collected: 01/16/23 1231    Order Status: Completed Specimen: Blood Updated: 01/18/23 1300     CULTURE, BLOOD ([facility]) No Growth At 48 Hours             See below for Radiology    Scheduled Med:   amLODIPine  2.5 mg Oral Daily    atorvastatin  20 mg Oral QHS     carvediloL  25 mg Oral BID    ferrous sulfate  1 tablet Oral Daily    folic acid  1,000 mcg Oral Daily    losartan  100 mg Oral Daily    perflutren lipid microspheres  1.3 mL Intravenous Once        Continuous Infusions:   dextrose 5 % and 0.9 % NaCl 50 mL/hr at 01/17/23 1430    pantoprazole (PROTONIX) IV infusion 8 mg/hr (01/17/23 0518)        PRN Meds:  dextrose 10%, dextrose 10%, dextrose 10%, dextrose 10%, glucagon (human recombinant), glucagon (human recombinant), glucose, glucose, insulin aspart U-100, ondansetron       Assessment/Plan:  Acute blood loss anemia requiring PRBC transfusion s/p EGD 1/17/23  Possible upper GI bleed  NSTEMI likely demand mediated  Moderate aortic valve stenosis status post bioprosthetic aortic valve EF 50%  Acute COVID 19 infection   CAL  Hyponatremia  Hypoglycemia in the setting of diabetes mellitus  History of essential HTN, HLD, CHF, and diabetes mellitus type 2    EGD showed nonbleeding gastric ulcers with no recent stigmata of bleeding.  Follow up EGD note for further recommendations.Needs PPI at the time of dc.GI signed off.cleared to dc from GI stand point.  Patient's hemoglobin slightly downtrended to 10.9 with no active evidence of GI bleeding.Monitor HH tmrw.   Obtained interval xray given faint crackles on exam, question progression of covid infection vs mild fluid overload.Of note, pt was hypoglycemic in the setting of NPO, iv fluids initiated. Sugars improved but still lower readings noted.   Will obtain A1c, Avoid ISS, Encourage po intake.  Pt is on room air now.Monitor her o2 needs today.  Echo reviewed, EF 50% mildly decreased systolic and diastolic function with moderate aortic stenosis with bioprosthetic aortic valve.  Will dc iv fluids, start her on home po lasix   supportive care for cough  remdesivir, dexamethasone not started given no o2 needs  Monitor sugars closely  PT evaluation  Needs diabetic meds adjustments at the time of dc.    VTE prophylaxis:   SCDs    Patient condition:  Fair    Anticipated discharge and Disposition:   TBD likely tmrw    All diagnosis and differential diagnosis have been reviewed; assessment and plan has been documented; I have personally reviewed the labs and test results that are presently available; I have reviewed the patients medication list; I have reviewed the consulting providers response and recommendations. I have reviewed or attempted to review medical records based upon their availability    All of the patient's questions have been  addressed and answered. Patient's is agreeable to the above stated plan. I will continue to monitor closely and make adjustments to medical management as needed.  _____________________________________________________________________    Nutrition Status:    Radiology:  Echo  · The estimated ejection fraction is 50%; Definity used; COVID +.  · Mild concentric hypertrophy and mildly decreased systolic function.  · There is mild mitral stenosis.  · The mean diastolic gradient across the mitral valve is 3 mmHg at a heart   rate of 56 bpm.  · Moderate mitral regurgitation.  · There is a bioprosthetic aortic valve present.  · The aortic valve mean gradient is 31 mmHg with a dimensionless index of   0.36.  · There is moderate aortic valve stenosis.  · Aortic valve area is 1.14 cm2; peak velocity is 3.58 m/s; mean gradient   is 31 mmHg.  · Moderate-to-severe aortic regurgitation.  · The estimated PA systolic pressure is 26 mmHg.  · There is mild left ventricular global hypokinesis.         Kareen Vieira MD   01/18/2023

## 2023-01-18 NOTE — PROGRESS NOTES
"The documentation recorded by the scribe/NP/PA accurately reflects the service I personally performed and the decisions made by me.       [facility]      Gastroenterology Progress Note    Subjective:  Patient feeling well post EGD without no GI complaints at this time.She has had multiple Bms post EGD, described as brown/green with no further evidence of melena.      Objective:    ROS:    Review of Systems   Constitutional:  Negative for chills and fever.   Respiratory:  Negative for shortness of breath and wheezing.    Gastrointestinal:  Negative for blood in stool, heartburn, melena, nausea and vomiting.   Neurological:  Negative for seizures and loss of consciousness.   Psychiatric/Behavioral:  The patient is not nervous/anxious.        Vital Signs:  BP (!) 113/56   Pulse 64   Temp 97.7 °F (36.5 °C) (Oral)   Resp 18   Ht 5' 1" (1.549 m)   Wt 72.6 kg (160 lb)   SpO2 (!) 90%   Breastfeeding No   BMI 30.23 kg/m²   Body mass index is 30.23 kg/m².    Physical Exam:    Physical Exam  HENT:      Head: Normocephalic.      Nose: Nose normal.   Eyes:      General: No scleral icterus.     Extraocular Movements: Extraocular movements intact.   Pulmonary:      Effort: No respiratory distress.      Breath sounds: No stridor. No wheezing.   Abdominal:      General: Abdomen is flat. There is no distension.      Palpations: Abdomen is soft.      Tenderness: There is no abdominal tenderness.   Skin:     Coloration: Skin is not jaundiced or pale.   Neurological:      General: No focal deficit present.      Mental Status: She is alert. Mental status is at baseline.   Psychiatric:         Mood and Affect: Mood normal.         Thought Content: Thought content normal.       Labs:  Recent Results (from the past 24 hour(s))   POCT glucose    Collection Time: 01/17/23  9:24 PM   Result Value Ref Range    POCT Glucose 60 (L) 70 - 110 mg/dL   POCT glucose    Collection Time: 01/18/23  2:25 AM "   Result Value Ref Range    POCT Glucose 72 70 - 110 mg/dL   POCT glucose    Collection Time: 01/18/23  5:37 AM   Result Value Ref Range    POCT Glucose 82 70 - 110 mg/dL   POCT glucose    Collection Time: 01/18/23 10:34 AM   Result Value Ref Range    POCT Glucose 128 (H) 70 - 110 mg/dL   CBC with Differential    Collection Time: 01/18/23 12:05 PM   Result Value Ref Range    WBC 4.2 (L) 4.5 - 11.5 x10(3)/mcL    RBC 3.88 (L) 4.20 - 5.40 x10(6)/mcL    Hgb 10.9 (L) 12.0 - 16.0 gm/dL    Hct 35.0 (L) 37.0 - 47.0 %    MCV 90.2 80.0 - 94.0 fL    MCH 28.1 pg    MCHC 31.1 (L) 33.0 - 36.0 mg/dL    RDW 15.5 11.5 - 17.0 %    Platelet 149 130 - 400 x10(3)/mcL    MPV 10.9 (H) 7.4 - 10.4 fL    Neut % 53.4 %    Lymph % 28.5 %    Mono % 14.4 %    Eos % 2.2 %    Basophil % 0.5 %    Lymph # 1.19 0.6 - 4.6 x10(3)/mcL    Neut # 2.23 2.1 - 9.2 x10(3)/mcL    Mono # 0.60 0.1 - 1.3 x10(3)/mcL    Eos # 0.09 0 - 0.9 x10(3)/mcL    Baso # 0.02 0 - 0.2 x10(3)/mcL    IG# 0.04 0 - 0.04 x10(3)/mcL    IG% 1.0 %    NRBC% 0.0 %         Assessment/Plan:  1. Gastrointestinal hemorrhage, unspecified gastrointestinal hemorrhage type    2. Hypoxia    3. Hypotension, unspecified hypotension type    4. Sepsis, due to unspecified organism, unspecified whether acute organ dysfunction present    5. COVID-19    6. Elevated troponin    7. CHF (congestive heart failure)    8. Melena       82 year old female known to Dr. Shukla with Hx CHF, CAD, diabetes mellitus type 2, and JEMIMA presented to the ED via EMS with hematemesis and melena.     Symptomatic anemia with melena, resolved  S/p EGD with non bleeding gastric ulcers with no stigmata of bleeding  - 10.3/33.6-->11.6/37.9-->10.9/35.0  with no blood transfusions thus far  NSTEMI  - cardiology following, echo pending  3. DM II  4. COVID+, asymptomatic      We will sign off at this time with no further Gi recommendations and resolution of melena. Please call back with any questions     Margot Cervantes,  YOAN  Gastroenterology  [facility]

## 2023-01-19 VITALS
TEMPERATURE: 99 F | RESPIRATION RATE: 18 BRPM | HEART RATE: 70 BPM | DIASTOLIC BLOOD PRESSURE: 50 MMHG | OXYGEN SATURATION: 95 % | WEIGHT: 160 LBS | SYSTOLIC BLOOD PRESSURE: 125 MMHG | HEIGHT: 61 IN | BODY MASS INDEX: 30.21 KG/M2

## 2023-01-19 PROBLEM — K92.2 GI BLEED: Status: RESOLVED | Noted: 2023-01-19 | Resolved: 2023-01-19

## 2023-01-19 PROBLEM — K92.2 GI BLEED: Status: ACTIVE | Noted: 2023-01-19

## 2023-01-19 LAB
ANION GAP SERPL CALC-SCNC: 8 MEQ/L
BASOPHILS # BLD AUTO: 0.03 X10(3)/MCL (ref 0–0.2)
BASOPHILS NFR BLD AUTO: 0.6 %
BUN SERPL-MCNC: 13.7 MG/DL (ref 9.8–20.1)
CALCIUM SERPL-MCNC: 8.6 MG/DL (ref 8.4–10.2)
CHLORIDE SERPL-SCNC: 103 MMOL/L (ref 98–107)
CO2 SERPL-SCNC: 23 MMOL/L (ref 23–31)
CREAT SERPL-MCNC: 1.21 MG/DL (ref 0.55–1.02)
CREAT/UREA NIT SERPL: 11
EOSINOPHIL # BLD AUTO: 0.13 X10(3)/MCL (ref 0–0.9)
EOSINOPHIL NFR BLD AUTO: 2.5 %
ERYTHROCYTE [DISTWIDTH] IN BLOOD BY AUTOMATED COUNT: 15.3 % (ref 11.5–17)
EST. AVERAGE GLUCOSE BLD GHB EST-MCNC: 180 MG/DL
GFR SERPLBLD CREATININE-BSD FMLA CKD-EPI: 45 MLS/MIN/1.73/M2
GLUCOSE SERPL-MCNC: 174 MG/DL (ref 82–115)
HBA1C MFR BLD: 7.9 %
HCT VFR BLD AUTO: 35.2 % (ref 37–47)
HGB BLD-MCNC: 11 GM/DL (ref 12–16)
IMM GRANULOCYTES # BLD AUTO: 0.06 X10(3)/MCL (ref 0–0.04)
IMM GRANULOCYTES NFR BLD AUTO: 1.1 %
LYMPHOCYTES # BLD AUTO: 1.2 X10(3)/MCL (ref 0.6–4.6)
LYMPHOCYTES NFR BLD AUTO: 22.9 %
MCH RBC QN AUTO: 28.2 PG
MCHC RBC AUTO-ENTMCNC: 31.3 MG/DL (ref 33–36)
MCV RBC AUTO: 90.3 FL (ref 80–94)
MONOCYTES # BLD AUTO: 0.68 X10(3)/MCL (ref 0.1–1.3)
MONOCYTES NFR BLD AUTO: 13 %
NEUTROPHILS # BLD AUTO: 3.13 X10(3)/MCL (ref 2.1–9.2)
NEUTROPHILS NFR BLD AUTO: 59.9 %
NRBC BLD AUTO-RTO: 0 %
PLATELET # BLD AUTO: 158 X10(3)/MCL (ref 130–400)
PMV BLD AUTO: 11 FL (ref 7.4–10.4)
POCT GLUCOSE: 169 MG/DL (ref 70–110)
POCT GLUCOSE: 226 MG/DL (ref 70–110)
POTASSIUM SERPL-SCNC: 4.3 MMOL/L (ref 3.5–5.1)
RBC # BLD AUTO: 3.9 X10(6)/MCL (ref 4.2–5.4)
SODIUM SERPL-SCNC: 134 MMOL/L (ref 136–145)
WBC # SPEC AUTO: 5.2 X10(3)/MCL (ref 4.5–11.5)

## 2023-01-19 PROCEDURE — 25000003 PHARM REV CODE 250: Performed by: INTERNAL MEDICINE

## 2023-01-19 PROCEDURE — 80048 BASIC METABOLIC PNL TOTAL CA: CPT | Performed by: INTERNAL MEDICINE

## 2023-01-19 PROCEDURE — 85025 COMPLETE CBC W/AUTO DIFF WBC: CPT | Performed by: INTERNAL MEDICINE

## 2023-01-19 PROCEDURE — 97161 PT EVAL LOW COMPLEX 20 MIN: CPT

## 2023-01-19 PROCEDURE — 83036 HEMOGLOBIN GLYCOSYLATED A1C: CPT | Performed by: INTERNAL MEDICINE

## 2023-01-19 PROCEDURE — 27000221 HC OXYGEN, UP TO 24 HOURS

## 2023-01-19 PROCEDURE — 36415 COLL VENOUS BLD VENIPUNCTURE: CPT | Performed by: INTERNAL MEDICINE

## 2023-01-19 RX ORDER — BENZONATATE 100 MG/1
100 CAPSULE ORAL 3 TIMES DAILY PRN
Qty: 20 CAPSULE | Refills: 0 | Status: SHIPPED | OUTPATIENT
Start: 2023-01-19 | End: 2023-01-29

## 2023-01-19 RX ADMIN — AMLODIPINE BESYLATE 2.5 MG: 2.5 TABLET ORAL at 09:01

## 2023-01-19 RX ADMIN — LOSARTAN POTASSIUM 100 MG: 50 TABLET, FILM COATED ORAL at 09:01

## 2023-01-19 RX ADMIN — CARVEDILOL 25 MG: 12.5 TABLET, FILM COATED ORAL at 09:01

## 2023-01-19 RX ADMIN — FOLIC ACID 1000 MCG: 1 TABLET ORAL at 09:01

## 2023-01-19 RX ADMIN — FERROUS SULFATE TAB 325 MG (65 MG ELEMENTAL FE) 1 EACH: 325 (65 FE) TAB at 09:01

## 2023-01-19 RX ADMIN — FUROSEMIDE 20 MG: 20 TABLET ORAL at 09:01

## 2023-01-19 RX ADMIN — PANTOPRAZOLE SODIUM 40 MG: 40 TABLET, DELAYED RELEASE ORAL at 06:01

## 2023-01-19 NOTE — PLAN OF CARE
Problem: Adult Inpatient Plan of Care  Goal: Plan of Care Review  Outcome: Met  Goal: Patient-Specific Goal (Individualized)  Outcome: Met  Goal: Absence of Hospital-Acquired Illness or Injury  Outcome: Met  Goal: Optimal Comfort and Wellbeing  Outcome: Met  Goal: Readiness for Transition of Care  Outcome: Met     Problem: Adjustment to Illness (Gastrointestinal Bleeding)  Goal: Optimal Coping with Acute Illness  Outcome: Met     Problem: Bleeding (Gastrointestinal Bleeding)  Goal: Hemostasis  Outcome: Met     Problem: Skin Injury Risk Increased  Goal: Skin Health and Integrity  Outcome: Met

## 2023-01-19 NOTE — DISCHARGE SUMMARY
[facility] - 4th [facility] Medicine  Discharge Summary      Patient Name: Rosemary Jackson  MRN: [id number]  [facility]: [id number]  Patient Class: IP- Inpatient  Admission Date: 1/16/2023  Hospital Length of Stay: 3 days  Discharge Date and Time:  01/19/2023 11:24 AM  Attending Physician: Leo Law MD   Discharging Provider: Jasper Oswald MD  Primary Care Provider: Reji Morrow MD    Primary Care Team: Networked reference to record PCT        82 y.o. female with a past medical history of essential hypertension, hyperlipidemia, CHF, CAD, diabetes mellitus type 2, and iron-deficiency anemia presented to [facility] on 1/16/2023 for hematemesis and melena.  EMS reported that patient was hypotensive with BP 90/30. Patient reports weakness for the past 3 days with dark/tarry stools, nausea, and cough. Patient denies chest pain, shortness of breath, dizziness, syncope,  abdominal pain, vomiting, diarrhea, congestion, sore throat, dysuria, hematuria, fever, and chills.      Initial vital signs in ED were BP 93/45, temperature 35.9° C, pulse 60, respirations 19, and SpO2 92% on nasal cannula.  Labs revealed WBC 4.1, RBC 3.72, hemoglobin 10.3, hematocrit 33.6, sodium 132, chloride 97, BUN 16.9, creatinine 1.86, glucose 127, AST 39, ALT 17, lactic acid 1.5, and troponin 0.078.  Flu testing was negative.  COVID testing was positive.  Blood cultures were obtained.  EKG revealed normal sinus rhythm with right bundle-branch block and rate of 63 beats per minute. Chest x-ray revealed chronic changes with no acute process.  Hemoccult testing was positive.  Patient remained hypotensive and was given I unit of emergent blood with fluid bolus and IV Protonix 80 mg.  Patient was also given IV vancomycin 1.5 g and Zosyn 4.5 g in ED. Patient was admitted to hospital medicine service for further medical management.      Patient was having melanotic stools, received 1 unit PRBC which improved  hemoglobin, underwent EGD which showed nonbleeding gastric ulcers with no stigmata of bleeding, melanotic stools resolved.  GI had no further recommendations and cleared for discharge.  Hemoglobin remained stable. Patient had an echo which showed EF 50% mildly decreased systolic function.  She remained stable from COVID-19 standpoint and did well room air.  Will discharge to home today.  Prior to discharge all medications were reconciled necessary prescriptions are provided.  Encouraged follow-up with PCP.      Acute blood loss anemia requiring PRBC transfusion s/p EGD 1/17/23  Possible upper GI bleed-resolved  NSTEMI likely demand mediated  Moderate aortic valve stenosis status post bioprosthetic aortic valve EF 50%  Acute COVID 19 infection   CAL-improving  Hyponatremia  Hypoglycemia in the setting of diabetes mellitus  History of essential HTN, HLD, CHF, and diabetes mellitus type 2        Procedure(s) (LRB):  EGD (N/A)            Goals of Care Treatment Preferences:  Code Status: Full Code      Consults:   Consults (From admission, onward)        Status Ordering Provider     Inpatient consult to Gastroenterology  Once        Provider:  Víctor Celis MD    Completed BERNIE LUQUE          No new Assessment & Plan notes have been filed under this hospital service since the last note was generated.  Service: Hospital Medicine    Final Active Diagnoses:      Problems Resolved During this Admission:    Diagnosis Date Noted Date Resolved POA    PRINCIPAL PROBLEM:  GI bleed [K92.2] 01/19/2023 01/19/2023 Yes       Discharged Condition: good    Disposition: Home or Self Care    Follow Up:   Follow-up Information     Reji Morrow MD Follow up in 1 week(s).    Specialty: Internal Medicine  Contact information:  [address]  [facility] [id number] [phone]                       Patient Instructions:   No discharge procedures on file.    Significant Diagnostic Studies: Labs:   CMP    Recent Labs   Lab 01/19/23  0552   *   K 4.3   CO2 23   BUN 13.7   CREATININE 1.21*   CALCIUM 8.6       Pending Diagnostic Studies:     None         Medications:  Reconciled Home Medications:      Medication List      START taking these medications    benzonatate 100 MG capsule  Commonly known as: TESSALON  Take 1 capsule (100 mg total) by mouth 3 (three) times daily as needed for Cough.        CONTINUE taking these medications    amLODIPine 2.5 MG tablet  Commonly known as: NORVASC  Take 2.5 mg by mouth.     aspirin 81 MG EC tablet  Commonly known as: ECOTRIN  Take 81 mg by mouth.     atorvastatin 20 MG tablet  Commonly known as: LIPITOR  Take 20 mg by mouth.     carvediloL 25 MG tablet  Commonly known as: COREG  Take 25 mg by mouth 2 (two) times daily.     ferrous sulfate 325 mg (65 mg iron) Tab tablet  Commonly known as: FEOSOL  Take 1 tablet by mouth once daily.     folic acid 1 MG tablet  Commonly known as: FOLVITE  Take 1,000 mcg by mouth.     furosemide 20 MG tablet  Commonly known as: LASIX  Take 20 mg by mouth.     glimepiride 4 MG tablet  Commonly known as: AMARYL  Take 4 mg by mouth.     JARDIANCE 25 mg tablet  Generic drug: empagliflozin  Take 25 mg by mouth.     losartan 100 MG tablet  Commonly known as: COZAAR  Take 100 mg by mouth.     pantoprazole 40 MG tablet  Commonly known as: PROTONIX  Take 40 mg by mouth.            Indwelling Lines/Drains at time of discharge:   Lines/Drains/Airways     None                 Time spent on the discharge of patient: 35 minutes         Jasper Oswald MD  Department of Hospital Medicine  [facility] - 4th Floor Medical Telemetry

## 2023-01-21 LAB
BACTERIA BLD CULT: NORMAL
BACTERIA BLD CULT: NORMAL

## 2025-07-03 NOTE — PT/OT/SLP EVAL
Physical Therapy Evaluation and Discharge Note    Patient Name:  Rosemary Jackson   MRN:  [id number]    Recommendations:     Discharge Recommendations: home  Discharge Equipment Recommendations: none   Barriers to discharge: None    Assessment:     Rosemary Jackson is a 82 y.o. female admitted with a medical diagnosis of <principal problem not specified>. .  At this time, patient is functioning at their prior level of function and does not require further acute PT services.     Recent Surgery: Procedure(s) (LRB):  EGD (N/A) 2 Days Post-Op    Plan:     During this hospitalization, patient does not require further acute PT services.  Please re-consult if situation changes.      Subjective     Chief Complaint: SOB  Patient/Family Comments/goals: none  Pain/Comfort:  Pain Rating 1: 0/10    Patients cultural, spiritual, [employer]ian conflicts given the current situation: no    Living Environment:  Lives alone in [facility] with ramp  Prior to admission, patients level of function was independent.  Equipment used at home: walker, rolling, cane, straight.  DME owned (not currently used): none.  Upon discharge, patient will have assistance from no one.    Objective:     Communicated with nurse prior to session.  Patient found sitting edge of bed with telemetry upon PT entry to room.    General Precautions: Standard,      Orthopedic Precautions:N/A   Braces: N/A  Respiratory Status: Room air. Satting in mid 90's    Exams:  Cognitive Exam:  Patient is oriented to Person, Place, Time, and Situation  Sensation:    -       Intact  RLE ROM: WNL  RLE Strength: WNL  LLE ROM: WNL  LLE Strength: WNL    Functional Mobility:  Transfers:     Sit to Stand:  independence with no AD  Gait: 30 ft independently  Balance: good    AM-PAC 6 CLICK MOBILITY  Total Score:24     AM-PAC 6 CLICK MOBILITY  Total Score:24     Patient left sitting edge of bed with all lines intact and call button in reach.    GOALS:   Multidisciplinary Problems       Physical  Therapy Goals       Not on file                    History:     History reviewed. No pertinent past medical history.    Past Surgical History:   Procedure Laterality Date    ESOPHAGOGASTRODUODENOSCOPY N/A 1/17/2023    Procedure: EGD;  Surgeon: Víctor Celis MD;  Location: [facility];  Service: Gastroenterology;  Laterality: N/A;       Time Tracking:     PT Received On: 01/19/23  PT Start Time: 0750     PT Stop Time: 0802  PT Total Time (min): 12 min     Billable Minutes: Evaluation 12 minutes      01/19/2023   03-Jul-2025

## (undated) DEVICE — TIP SUCTION YANKAUER

## (undated) DEVICE — SOL IRRI STRL WATER 1000ML

## (undated) DEVICE — TUBING O2 FEMALE CONN 13FT

## (undated) DEVICE — COLLECTION SPECIMEN NEPTUNE

## (undated) DEVICE — KIT SURGICAL COLON .25 1.1OZ

## (undated) DEVICE — KIT CANIST SUCTION 1200CC